# Patient Record
Sex: FEMALE | Race: OTHER | NOT HISPANIC OR LATINO
[De-identification: names, ages, dates, MRNs, and addresses within clinical notes are randomized per-mention and may not be internally consistent; named-entity substitution may affect disease eponyms.]

---

## 2017-03-14 ENCOUNTER — APPOINTMENT (OUTPATIENT)
Dept: INTERNAL MEDICINE | Facility: CLINIC | Age: 52
End: 2017-03-14

## 2017-03-17 ENCOUNTER — APPOINTMENT (OUTPATIENT)
Dept: INTERNAL MEDICINE | Facility: CLINIC | Age: 52
End: 2017-03-17

## 2017-07-13 ENCOUNTER — RESULT CHARGE (OUTPATIENT)
Age: 52
End: 2017-07-13

## 2017-07-14 ENCOUNTER — NON-APPOINTMENT (OUTPATIENT)
Age: 52
End: 2017-07-14

## 2017-07-14 ENCOUNTER — LABORATORY RESULT (OUTPATIENT)
Age: 52
End: 2017-07-14

## 2017-07-14 ENCOUNTER — APPOINTMENT (OUTPATIENT)
Dept: INTERNAL MEDICINE | Facility: CLINIC | Age: 52
End: 2017-07-14

## 2017-07-14 ENCOUNTER — OUTPATIENT (OUTPATIENT)
Dept: OUTPATIENT SERVICES | Facility: HOSPITAL | Age: 52
LOS: 1 days | End: 2017-07-14
Payer: SELF-PAY

## 2017-07-14 VITALS
BODY MASS INDEX: 34.96 KG/M2 | SYSTOLIC BLOOD PRESSURE: 118 MMHG | WEIGHT: 190 LBS | DIASTOLIC BLOOD PRESSURE: 70 MMHG | HEIGHT: 62 IN

## 2017-07-14 DIAGNOSIS — I10 ESSENTIAL (PRIMARY) HYPERTENSION: ICD-10-CM

## 2017-07-14 PROCEDURE — 90715 TDAP VACCINE 7 YRS/> IM: CPT

## 2017-07-14 PROCEDURE — 84443 ASSAY THYROID STIM HORMONE: CPT

## 2017-07-14 PROCEDURE — 83036 HEMOGLOBIN GLYCOSYLATED A1C: CPT

## 2017-07-14 PROCEDURE — 80061 LIPID PANEL: CPT

## 2017-07-14 PROCEDURE — G0463: CPT

## 2017-07-14 PROCEDURE — 90471 IMMUNIZATION ADMIN: CPT

## 2017-07-14 PROCEDURE — 93005 ELECTROCARDIOGRAM TRACING: CPT

## 2017-07-15 LAB
CHOLEST SERPL-MCNC: 220 MG/DL — HIGH (ref 10–199)
HBA1C BLD-MCNC: 6.3 % — HIGH (ref 4–5.6)
HDLC SERPL-MCNC: 60 MG/DL — SIGNIFICANT CHANGE UP (ref 40–125)
LIPID PNL WITH DIRECT LDL SERPL: 131 MG/DL — HIGH
T4 FREE+ TSH PNL SERPL: 2.15 UIU/ML — SIGNIFICANT CHANGE UP (ref 0.27–4.2)
TOTAL CHOLESTEROL/HDL RATIO MEASUREMENT: 3.7 RATIO — SIGNIFICANT CHANGE UP (ref 3.3–7.1)
TRIGL SERPL-MCNC: 147 MG/DL — SIGNIFICANT CHANGE UP (ref 10–149)

## 2017-07-19 DIAGNOSIS — Z23 ENCOUNTER FOR IMMUNIZATION: ICD-10-CM

## 2017-07-19 DIAGNOSIS — B35.3 TINEA PEDIS: ICD-10-CM

## 2017-07-19 DIAGNOSIS — R00.2 PALPITATIONS: ICD-10-CM

## 2017-07-19 DIAGNOSIS — R73.02 IMPAIRED GLUCOSE TOLERANCE (ORAL): ICD-10-CM

## 2017-07-19 DIAGNOSIS — E66.9 OBESITY, UNSPECIFIED: ICD-10-CM

## 2017-08-10 ENCOUNTER — FORM ENCOUNTER (OUTPATIENT)
Age: 52
End: 2017-08-10

## 2017-08-11 ENCOUNTER — APPOINTMENT (OUTPATIENT)
Dept: MAMMOGRAPHY | Facility: IMAGING CENTER | Age: 52
End: 2017-08-11
Payer: COMMERCIAL

## 2017-08-11 ENCOUNTER — OUTPATIENT (OUTPATIENT)
Dept: OUTPATIENT SERVICES | Facility: HOSPITAL | Age: 52
LOS: 1 days | End: 2017-08-11
Payer: SELF-PAY

## 2017-08-11 DIAGNOSIS — B35.3 TINEA PEDIS: ICD-10-CM

## 2017-08-11 DIAGNOSIS — R73.02 IMPAIRED GLUCOSE TOLERANCE (ORAL): ICD-10-CM

## 2017-08-11 DIAGNOSIS — Z23 ENCOUNTER FOR IMMUNIZATION: ICD-10-CM

## 2017-08-11 DIAGNOSIS — R00.2 PALPITATIONS: ICD-10-CM

## 2017-08-11 DIAGNOSIS — E66.9 OBESITY, UNSPECIFIED: ICD-10-CM

## 2017-08-11 DIAGNOSIS — I10 ESSENTIAL (PRIMARY) HYPERTENSION: ICD-10-CM

## 2017-08-11 DIAGNOSIS — Z12.31 ENCOUNTER FOR SCREENING MAMMOGRAM FOR MALIGNANT NEOPLASM OF BREAST: ICD-10-CM

## 2017-08-11 PROCEDURE — 77067 SCR MAMMO BI INCL CAD: CPT

## 2017-08-11 PROCEDURE — G0202: CPT | Mod: 26

## 2017-08-11 PROCEDURE — 77063 BREAST TOMOSYNTHESIS BI: CPT

## 2017-08-11 PROCEDURE — 77063 BREAST TOMOSYNTHESIS BI: CPT | Mod: 26

## 2017-09-13 ENCOUNTER — LABORATORY RESULT (OUTPATIENT)
Age: 52
End: 2017-09-13

## 2017-09-13 ENCOUNTER — APPOINTMENT (OUTPATIENT)
Dept: OBGYN | Facility: CLINIC | Age: 52
End: 2017-09-13
Payer: COMMERCIAL

## 2017-09-13 ENCOUNTER — RESULT REVIEW (OUTPATIENT)
Age: 52
End: 2017-09-13

## 2017-09-13 ENCOUNTER — OUTPATIENT (OUTPATIENT)
Dept: OUTPATIENT SERVICES | Facility: HOSPITAL | Age: 52
LOS: 1 days | End: 2017-09-13
Payer: SELF-PAY

## 2017-09-13 VITALS — WEIGHT: 192 LBS | SYSTOLIC BLOOD PRESSURE: 122 MMHG | DIASTOLIC BLOOD PRESSURE: 80 MMHG | BODY MASS INDEX: 35.12 KG/M2

## 2017-09-13 DIAGNOSIS — Z01.419 ENCOUNTER FOR GYNECOLOGICAL EXAMINATION (GENERAL) (ROUTINE) WITHOUT ABNORMAL FINDINGS: ICD-10-CM

## 2017-09-13 DIAGNOSIS — N76.0 ACUTE VAGINITIS: ICD-10-CM

## 2017-09-13 PROCEDURE — 87624 HPV HI-RISK TYP POOLED RSLT: CPT

## 2017-09-13 PROCEDURE — G0463: CPT

## 2017-09-13 PROCEDURE — 88175 CYTOPATH C/V AUTO FLUID REDO: CPT

## 2017-09-13 PROCEDURE — 99386 PREV VISIT NEW AGE 40-64: CPT | Mod: NC

## 2018-01-26 ENCOUNTER — APPOINTMENT (OUTPATIENT)
Dept: OPHTHALMOLOGY | Facility: CLINIC | Age: 53
End: 2018-01-26

## 2018-01-26 ENCOUNTER — OUTPATIENT (OUTPATIENT)
Dept: OUTPATIENT SERVICES | Facility: HOSPITAL | Age: 53
LOS: 1 days | End: 2018-01-26

## 2018-02-28 DIAGNOSIS — H40.003 PREGLAUCOMA, UNSPECIFIED, BILATERAL: ICD-10-CM

## 2018-03-02 ENCOUNTER — APPOINTMENT (OUTPATIENT)
Dept: DERMATOLOGY | Facility: HOSPITAL | Age: 53
End: 2018-03-02
Payer: COMMERCIAL

## 2018-03-02 ENCOUNTER — OUTPATIENT (OUTPATIENT)
Dept: OUTPATIENT SERVICES | Facility: HOSPITAL | Age: 53
LOS: 1 days | End: 2018-03-02

## 2018-03-02 VITALS
HEART RATE: 67 BPM | SYSTOLIC BLOOD PRESSURE: 121 MMHG | HEIGHT: 62 IN | WEIGHT: 193 LBS | DIASTOLIC BLOOD PRESSURE: 77 MMHG | BODY MASS INDEX: 35.51 KG/M2

## 2018-03-02 PROCEDURE — 99202 OFFICE O/P NEW SF 15 MIN: CPT | Mod: 25,NC

## 2018-03-02 PROCEDURE — 11900 INJECT SKIN LESIONS </W 7: CPT | Mod: GC,NC

## 2018-03-05 DIAGNOSIS — L91.0 HYPERTROPHIC SCAR: ICD-10-CM

## 2018-03-05 DIAGNOSIS — D22.9 MELANOCYTIC NEVI, UNSPECIFIED: ICD-10-CM

## 2018-03-16 ENCOUNTER — LABORATORY RESULT (OUTPATIENT)
Age: 53
End: 2018-03-16

## 2018-03-16 ENCOUNTER — OUTPATIENT (OUTPATIENT)
Dept: OUTPATIENT SERVICES | Facility: HOSPITAL | Age: 53
LOS: 1 days | End: 2018-03-16
Payer: SELF-PAY

## 2018-03-16 ENCOUNTER — APPOINTMENT (OUTPATIENT)
Dept: OPHTHALMOLOGY | Facility: CLINIC | Age: 53
End: 2018-03-16

## 2018-03-16 ENCOUNTER — OUTPATIENT (OUTPATIENT)
Dept: OUTPATIENT SERVICES | Facility: HOSPITAL | Age: 53
LOS: 1 days | End: 2018-03-16

## 2018-03-16 ENCOUNTER — APPOINTMENT (OUTPATIENT)
Dept: INTERNAL MEDICINE | Facility: CLINIC | Age: 53
End: 2018-03-16

## 2018-03-16 VITALS
HEART RATE: 68 BPM | BODY MASS INDEX: 34.75 KG/M2 | SYSTOLIC BLOOD PRESSURE: 110 MMHG | WEIGHT: 190 LBS | OXYGEN SATURATION: 99 % | DIASTOLIC BLOOD PRESSURE: 70 MMHG

## 2018-03-16 DIAGNOSIS — I10 ESSENTIAL (PRIMARY) HYPERTENSION: ICD-10-CM

## 2018-03-16 DIAGNOSIS — R60.0 LOCALIZED EDEMA: ICD-10-CM

## 2018-03-16 DIAGNOSIS — R73.02 IMPAIRED GLUCOSE TOLERANCE (ORAL): ICD-10-CM

## 2018-03-16 DIAGNOSIS — E78.5 HYPERLIPIDEMIA, UNSPECIFIED: ICD-10-CM

## 2018-03-16 DIAGNOSIS — D22.9 MELANOCYTIC NEVI, UNSPECIFIED: ICD-10-CM

## 2018-03-16 DIAGNOSIS — E66.9 OBESITY, UNSPECIFIED: ICD-10-CM

## 2018-03-16 DIAGNOSIS — B35.3 TINEA PEDIS: ICD-10-CM

## 2018-03-16 DIAGNOSIS — Z01.419 ENCOUNTER FOR GYNECOLOGICAL EXAMINATION (GENERAL) (ROUTINE) W/OUT ABNORMAL FINDINGS: ICD-10-CM

## 2018-03-16 LAB — HBA1C BLD-MCNC: 6.3 % — HIGH (ref 4–5.6)

## 2018-03-16 PROCEDURE — G0463: CPT

## 2018-03-16 PROCEDURE — 80048 BASIC METABOLIC PNL TOTAL CA: CPT

## 2018-03-16 PROCEDURE — 83036 HEMOGLOBIN GLYCOSYLATED A1C: CPT

## 2018-03-16 RX ORDER — CLOTRIMAZOLE 1% ANTIFUNGAL CREAM 1 G/100G
1 CREAM TOPICAL TWICE DAILY
Qty: 1 | Refills: 0 | Status: DISCONTINUED | COMMUNITY
Start: 2017-07-14 | End: 2018-03-16

## 2018-03-17 LAB
ANION GAP SERPL CALC-SCNC: 15 MMOL/L — SIGNIFICANT CHANGE UP (ref 5–17)
BUN SERPL-MCNC: 17 MG/DL — SIGNIFICANT CHANGE UP (ref 7–23)
CALCIUM SERPL-MCNC: 9.7 MG/DL — SIGNIFICANT CHANGE UP (ref 8.4–10.5)
CHLORIDE SERPL-SCNC: 101 MMOL/L — SIGNIFICANT CHANGE UP (ref 96–108)
CO2 SERPL-SCNC: 23 MMOL/L — SIGNIFICANT CHANGE UP (ref 22–31)
CREAT SERPL-MCNC: 0.96 MG/DL — SIGNIFICANT CHANGE UP (ref 0.5–1.3)
GLUCOSE SERPL-MCNC: 174 MG/DL — HIGH (ref 70–99)
POTASSIUM SERPL-MCNC: 4.7 MMOL/L — SIGNIFICANT CHANGE UP (ref 3.5–5.3)
POTASSIUM SERPL-SCNC: 4.7 MMOL/L — SIGNIFICANT CHANGE UP (ref 3.5–5.3)
SODIUM SERPL-SCNC: 139 MMOL/L — SIGNIFICANT CHANGE UP (ref 135–145)

## 2018-03-28 DIAGNOSIS — R73.02 IMPAIRED GLUCOSE TOLERANCE (ORAL): ICD-10-CM

## 2018-03-28 DIAGNOSIS — E78.5 HYPERLIPIDEMIA, UNSPECIFIED: ICD-10-CM

## 2018-03-28 DIAGNOSIS — E66.9 OBESITY, UNSPECIFIED: ICD-10-CM

## 2018-03-28 DIAGNOSIS — L91.0 HYPERTROPHIC SCAR: ICD-10-CM

## 2018-04-02 DIAGNOSIS — H40.003 PREGLAUCOMA, UNSPECIFIED, BILATERAL: ICD-10-CM

## 2018-04-17 ENCOUNTER — APPOINTMENT (OUTPATIENT)
Dept: PLASTIC SURGERY | Facility: HOSPITAL | Age: 53
End: 2018-04-17

## 2018-04-17 ENCOUNTER — OUTPATIENT (OUTPATIENT)
Dept: OUTPATIENT SERVICES | Facility: HOSPITAL | Age: 53
LOS: 1 days | End: 2018-04-17

## 2018-04-17 VITALS
HEART RATE: 69 BPM | DIASTOLIC BLOOD PRESSURE: 73 MMHG | BODY MASS INDEX: 36.28 KG/M2 | WEIGHT: 198.38 LBS | SYSTOLIC BLOOD PRESSURE: 134 MMHG

## 2018-04-18 DIAGNOSIS — R22.0 LOCALIZED SWELLING, MASS AND LUMP, HEAD: ICD-10-CM

## 2018-04-20 ENCOUNTER — OUTPATIENT (OUTPATIENT)
Dept: OUTPATIENT SERVICES | Facility: HOSPITAL | Age: 53
LOS: 1 days | End: 2018-04-20

## 2018-04-20 ENCOUNTER — APPOINTMENT (OUTPATIENT)
Dept: DERMATOLOGY | Facility: HOSPITAL | Age: 53
End: 2018-04-20
Payer: COMMERCIAL

## 2018-04-20 VITALS
BODY MASS INDEX: 35.51 KG/M2 | SYSTOLIC BLOOD PRESSURE: 144 MMHG | HEART RATE: 73 BPM | WEIGHT: 193 LBS | HEIGHT: 62 IN | DIASTOLIC BLOOD PRESSURE: 85 MMHG

## 2018-04-20 PROCEDURE — 11901 INJECT SKIN LESIONS >7: CPT | Mod: GC,NC

## 2018-04-23 DIAGNOSIS — L91.0 HYPERTROPHIC SCAR: ICD-10-CM

## 2018-05-04 ENCOUNTER — APPOINTMENT (OUTPATIENT)
Dept: OPHTHALMOLOGY | Facility: CLINIC | Age: 53
End: 2018-05-04

## 2018-05-04 ENCOUNTER — OUTPATIENT (OUTPATIENT)
Dept: OUTPATIENT SERVICES | Facility: HOSPITAL | Age: 53
LOS: 1 days | End: 2018-05-04

## 2018-05-11 ENCOUNTER — APPOINTMENT (OUTPATIENT)
Dept: INTERNAL MEDICINE | Facility: CLINIC | Age: 53
End: 2018-05-11

## 2018-05-11 ENCOUNTER — OUTPATIENT (OUTPATIENT)
Dept: OUTPATIENT SERVICES | Facility: HOSPITAL | Age: 53
LOS: 1 days | End: 2018-05-11
Payer: SELF-PAY

## 2018-05-11 VITALS — DIASTOLIC BLOOD PRESSURE: 70 MMHG | SYSTOLIC BLOOD PRESSURE: 118 MMHG

## 2018-05-11 VITALS
HEIGHT: 62 IN | BODY MASS INDEX: 35.51 KG/M2 | SYSTOLIC BLOOD PRESSURE: 112 MMHG | WEIGHT: 193 LBS | OXYGEN SATURATION: 98 % | HEART RATE: 69 BPM | DIASTOLIC BLOOD PRESSURE: 70 MMHG

## 2018-05-11 DIAGNOSIS — M54.2 CERVICALGIA: ICD-10-CM

## 2018-05-11 DIAGNOSIS — I10 ESSENTIAL (PRIMARY) HYPERTENSION: ICD-10-CM

## 2018-05-11 DIAGNOSIS — E78.5 HYPERLIPIDEMIA, UNSPECIFIED: ICD-10-CM

## 2018-05-11 DIAGNOSIS — L91.0 HYPERTROPHIC SCAR: ICD-10-CM

## 2018-05-11 DIAGNOSIS — R73.02 IMPAIRED GLUCOSE TOLERANCE (ORAL): ICD-10-CM

## 2018-05-11 DIAGNOSIS — E66.9 OBESITY, UNSPECIFIED: ICD-10-CM

## 2018-05-11 DIAGNOSIS — R51 HEADACHE: ICD-10-CM

## 2018-05-11 PROCEDURE — G0463: CPT

## 2018-05-18 ENCOUNTER — OUTPATIENT (OUTPATIENT)
Dept: OUTPATIENT SERVICES | Facility: HOSPITAL | Age: 53
LOS: 1 days | End: 2018-05-18

## 2018-05-18 ENCOUNTER — APPOINTMENT (OUTPATIENT)
Dept: DERMATOLOGY | Facility: HOSPITAL | Age: 53
End: 2018-05-18
Payer: COMMERCIAL

## 2018-05-18 VITALS
HEART RATE: 66 BPM | HEIGHT: 62 IN | WEIGHT: 189 LBS | SYSTOLIC BLOOD PRESSURE: 122 MMHG | BODY MASS INDEX: 34.78 KG/M2 | DIASTOLIC BLOOD PRESSURE: 72 MMHG

## 2018-05-18 DIAGNOSIS — L91.0 HYPERTROPHIC SCAR: ICD-10-CM

## 2018-05-18 DIAGNOSIS — E85.4 ORGAN-LIMITED AMYLOIDOSIS: ICD-10-CM

## 2018-05-18 DIAGNOSIS — L99 ORGAN-LIMITED AMYLOIDOSIS: ICD-10-CM

## 2018-05-18 PROBLEM — M54.2 NECK PAIN: Status: ACTIVE | Noted: 2018-05-18

## 2018-05-18 PROBLEM — R51 HEADACHE: Status: ACTIVE | Noted: 2018-05-18

## 2018-05-18 PROCEDURE — 11900 INJECT SKIN LESIONS </W 7: CPT | Mod: NC

## 2018-05-18 PROCEDURE — 99213 OFFICE O/P EST LOW 20 MIN: CPT | Mod: 25,NC

## 2018-05-22 DIAGNOSIS — M54.2 CERVICALGIA: ICD-10-CM

## 2018-05-22 DIAGNOSIS — R51 HEADACHE: ICD-10-CM

## 2018-07-20 ENCOUNTER — APPOINTMENT (OUTPATIENT)
Dept: DERMATOLOGY | Facility: HOSPITAL | Age: 53
End: 2018-07-20
Payer: COMMERCIAL

## 2018-07-20 ENCOUNTER — OUTPATIENT (OUTPATIENT)
Dept: OUTPATIENT SERVICES | Facility: HOSPITAL | Age: 53
LOS: 1 days | End: 2018-07-20

## 2018-07-20 VITALS
SYSTOLIC BLOOD PRESSURE: 133 MMHG | DIASTOLIC BLOOD PRESSURE: 84 MMHG | BODY MASS INDEX: 36.07 KG/M2 | HEART RATE: 65 BPM | HEIGHT: 62 IN | WEIGHT: 196 LBS

## 2018-07-20 DIAGNOSIS — L91.0 HYPERTROPHIC SCAR: ICD-10-CM

## 2018-07-20 PROCEDURE — 11901 INJECT SKIN LESIONS >7: CPT | Mod: NC,GC

## 2018-08-03 ENCOUNTER — APPOINTMENT (OUTPATIENT)
Dept: DERMATOLOGY | Facility: HOSPITAL | Age: 53
End: 2018-08-03

## 2018-09-07 ENCOUNTER — APPOINTMENT (OUTPATIENT)
Dept: DERMATOLOGY | Facility: HOSPITAL | Age: 53
End: 2018-09-07
Payer: COMMERCIAL

## 2018-09-07 ENCOUNTER — OUTPATIENT (OUTPATIENT)
Dept: OUTPATIENT SERVICES | Facility: HOSPITAL | Age: 53
LOS: 1 days | End: 2018-09-07

## 2018-09-07 VITALS
HEART RATE: 64 BPM | DIASTOLIC BLOOD PRESSURE: 75 MMHG | WEIGHT: 197 LBS | HEIGHT: 62 IN | SYSTOLIC BLOOD PRESSURE: 128 MMHG | BODY MASS INDEX: 36.25 KG/M2

## 2018-09-07 DIAGNOSIS — L91.0 HYPERTROPHIC SCAR: ICD-10-CM

## 2018-09-07 PROCEDURE — 99213 OFFICE O/P EST LOW 20 MIN: CPT | Mod: NC

## 2018-09-26 DIAGNOSIS — H40.003 PREGLAUCOMA, UNSPECIFIED, BILATERAL: ICD-10-CM

## 2018-11-01 ENCOUNTER — FORM ENCOUNTER (OUTPATIENT)
Age: 53
End: 2018-11-01

## 2018-11-02 ENCOUNTER — OUTPATIENT (OUTPATIENT)
Dept: OUTPATIENT SERVICES | Facility: HOSPITAL | Age: 53
LOS: 1 days | End: 2018-11-02
Payer: SELF-PAY

## 2018-11-02 ENCOUNTER — APPOINTMENT (OUTPATIENT)
Dept: MAMMOGRAPHY | Facility: IMAGING CENTER | Age: 53
End: 2018-11-02
Payer: COMMERCIAL

## 2018-11-02 DIAGNOSIS — Z00.8 ENCOUNTER FOR OTHER GENERAL EXAMINATION: ICD-10-CM

## 2018-11-02 PROCEDURE — 77067 SCR MAMMO BI INCL CAD: CPT

## 2018-11-02 PROCEDURE — 77063 BREAST TOMOSYNTHESIS BI: CPT

## 2018-11-02 PROCEDURE — 77063 BREAST TOMOSYNTHESIS BI: CPT | Mod: 26

## 2018-11-02 PROCEDURE — 77067 SCR MAMMO BI INCL CAD: CPT | Mod: 26

## 2018-11-06 ENCOUNTER — FORM ENCOUNTER (OUTPATIENT)
Age: 53
End: 2018-11-06

## 2018-11-07 ENCOUNTER — OUTPATIENT (OUTPATIENT)
Dept: OUTPATIENT SERVICES | Facility: HOSPITAL | Age: 53
LOS: 1 days | End: 2018-11-07
Payer: SELF-PAY

## 2018-11-07 ENCOUNTER — APPOINTMENT (OUTPATIENT)
Dept: MAMMOGRAPHY | Facility: IMAGING CENTER | Age: 53
End: 2018-11-07
Payer: COMMERCIAL

## 2018-11-07 ENCOUNTER — RESULT REVIEW (OUTPATIENT)
Age: 53
End: 2018-11-07

## 2018-11-07 ENCOUNTER — APPOINTMENT (OUTPATIENT)
Dept: ULTRASOUND IMAGING | Facility: IMAGING CENTER | Age: 53
End: 2018-11-07
Payer: COMMERCIAL

## 2018-11-07 DIAGNOSIS — Z00.8 ENCOUNTER FOR OTHER GENERAL EXAMINATION: ICD-10-CM

## 2018-11-07 PROCEDURE — G0279: CPT | Mod: 26

## 2018-11-07 PROCEDURE — 76642 ULTRASOUND BREAST LIMITED: CPT | Mod: 26,LT

## 2018-11-07 PROCEDURE — 77065 DX MAMMO INCL CAD UNI: CPT | Mod: 26,LT

## 2018-11-07 PROCEDURE — 77065 DX MAMMO INCL CAD UNI: CPT

## 2018-11-07 PROCEDURE — G0279: CPT

## 2018-11-07 PROCEDURE — 76642 ULTRASOUND BREAST LIMITED: CPT

## 2018-11-09 ENCOUNTER — OUTPATIENT (OUTPATIENT)
Dept: OUTPATIENT SERVICES | Facility: HOSPITAL | Age: 53
LOS: 1 days | End: 2018-11-09

## 2018-11-09 ENCOUNTER — APPOINTMENT (OUTPATIENT)
Dept: OPHTHALMOLOGY | Facility: CLINIC | Age: 53
End: 2018-11-09

## 2018-11-29 ENCOUNTER — FORM ENCOUNTER (OUTPATIENT)
Age: 53
End: 2018-11-29

## 2018-11-29 ENCOUNTER — RESULT REVIEW (OUTPATIENT)
Age: 53
End: 2018-11-29

## 2018-11-30 ENCOUNTER — OUTPATIENT (OUTPATIENT)
Dept: OUTPATIENT SERVICES | Facility: HOSPITAL | Age: 53
LOS: 1 days | End: 2018-11-30
Payer: SELF-PAY

## 2018-11-30 ENCOUNTER — APPOINTMENT (OUTPATIENT)
Dept: ULTRASOUND IMAGING | Facility: IMAGING CENTER | Age: 53
End: 2018-11-30
Payer: SELF-PAY

## 2018-11-30 DIAGNOSIS — N63.0 UNSPECIFIED LUMP IN UNSPECIFIED BREAST: ICD-10-CM

## 2018-11-30 PROCEDURE — 19000 PUNCTURE ASPIR CYST BREAST: CPT

## 2018-11-30 PROCEDURE — 19000 PUNCTURE ASPIR CYST BREAST: CPT | Mod: LT

## 2018-11-30 PROCEDURE — 76942 ECHO GUIDE FOR BIOPSY: CPT | Mod: 26

## 2018-11-30 PROCEDURE — 88173 CYTOPATH EVAL FNA REPORT: CPT

## 2018-11-30 PROCEDURE — 88173 CYTOPATH EVAL FNA REPORT: CPT | Mod: 26

## 2018-11-30 PROCEDURE — 76942 ECHO GUIDE FOR BIOPSY: CPT

## 2018-12-05 LAB — NON-GYNECOLOGICAL CYTOLOGY STUDY: SIGNIFICANT CHANGE UP

## 2018-12-12 ENCOUNTER — OUTPATIENT (OUTPATIENT)
Dept: OUTPATIENT SERVICES | Facility: HOSPITAL | Age: 53
LOS: 1 days | End: 2018-12-12
Payer: SELF-PAY

## 2018-12-12 ENCOUNTER — APPOINTMENT (OUTPATIENT)
Dept: INTERNAL MEDICINE | Facility: CLINIC | Age: 53
End: 2018-12-12

## 2018-12-12 VITALS
SYSTOLIC BLOOD PRESSURE: 140 MMHG | DIASTOLIC BLOOD PRESSURE: 100 MMHG | BODY MASS INDEX: 36.44 KG/M2 | WEIGHT: 198 LBS | HEIGHT: 62 IN

## 2018-12-12 VITALS — DIASTOLIC BLOOD PRESSURE: 70 MMHG | SYSTOLIC BLOOD PRESSURE: 112 MMHG

## 2018-12-12 DIAGNOSIS — I10 ESSENTIAL (PRIMARY) HYPERTENSION: ICD-10-CM

## 2018-12-12 DIAGNOSIS — L65.9 NONSCARRING HAIR LOSS, UNSPECIFIED: ICD-10-CM

## 2018-12-12 DIAGNOSIS — H91.90 UNSPECIFIED HEARING LOSS, UNSPECIFIED EAR: ICD-10-CM

## 2018-12-12 PROCEDURE — G0463: CPT

## 2018-12-12 RX ORDER — TRIAMCINOLONE ACETONIDE 1 MG/G
0.1 CREAM TOPICAL 3 TIMES DAILY
Qty: 1 | Refills: 1 | Status: COMPLETED | COMMUNITY
Start: 2018-05-18 | End: 2018-12-12

## 2018-12-12 NOTE — ASSESSMENT
[FreeTextEntry1] : 52 yo F w/PMH of HTN, pre-DM (A1c 6.3% in March 2018), recent breast mass seen on screening mammography s/p FNA presenting for CPE\par \par 1. Breast mass\par - US breast provided to ensure resolution after aspiration\par - pathology c/w benign cyst\par \par 2. Hair loss\par - will check TSH today\par - pull test positive with 6 strands removed\par - recommend biotin for now - if worsening can return to derm clinic\par \par 3. Hand pain/growth\par - ?tendon calcification vs ganglion cyst\par - currently minimally bothering her\par - recommended if worsening, we can refer her to hand surgery, she declined today\par \par 4. HTN\par - Lisinopril renewed\par \par 5. HCM\par - last pap negative, HRHPV negative 9/2017\par - mammo as above\par - FIT test 2014 negative - repeat FIT today as colonoscopy declined ( recently had difficult time with prep) with understanding she will need colonoscopy if FIT positive\par - HIV negative 2017\par - Hep C negative 2017\par - TdaP 7/2017\par - got flu in 10/2018\par \par dw Dr. Kaur\par \par Kathleen Rivers, R3

## 2018-12-12 NOTE — REVIEW OF SYSTEMS
[Fever] : no fever [Chills] : no chills [Fatigue] : no fatigue [Discharge] : no discharge [Pain] : no pain [Earache] : no earache [Hearing Loss] : no hearing loss [Nasal Discharge] : no nasal discharge [Sore Throat] : no sore throat [Chest Pain] : no chest pain [Palpitations] : no palpitations [Shortness Of Breath] : no shortness of breath [Wheezing] : no wheezing [Cough] : no cough [Abdominal Pain] : no abdominal pain [Nausea] : no nausea [Vomiting] : no vomiting [Joint Pain] : no joint pain [Muscle Pain] : no muscle pain [Itching] : no itching [Skin Rash] : no skin rash [Headache] : no headache [Dizziness] : no dizziness [Fainting] : no fainting [Anxiety] : no anxiety

## 2018-12-12 NOTE — PHYSICAL EXAM
[No Acute Distress] : no acute distress [Well Nourished] : well nourished [Well Developed] : well developed [Well-Appearing] : well-appearing [Normal Sclera/Conjunctiva] : normal sclera/conjunctiva [PERRL] : pupils equal round and reactive to light [EOMI] : extraocular movements intact [Normal Outer Ear/Nose] : the outer ears and nose were normal in appearance [Normal Oropharynx] : the oropharynx was normal [Supple] : supple [No Respiratory Distress] : no respiratory distress  [Clear to Auscultation] : lungs were clear to auscultation bilaterally [No Accessory Muscle Use] : no accessory muscle use [Normal Rate] : normal rate  [Regular Rhythm] : with a regular rhythm [Normal S1, S2] : normal S1 and S2 [Soft] : abdomen soft [Non Tender] : non-tender [Normal Bowel Sounds] : normal bowel sounds [No CVA Tenderness] : no CVA  tenderness [No Rash] : no rash [Normal Gait] : normal gait [No Focal Deficits] : no focal deficits [Normal Affect] : the affect was normal [Normal Insight/Judgement] : insight and judgment were intact

## 2018-12-12 NOTE — HISTORY OF PRESENT ILLNESS
[de-identified] : Patient is a 54 yo F w/PMH of HTN, pre-DM (A1c 6.3% in March 2018), recent breast mass seen on screening mammography s/p FNA presenting for CPE.\par \par She had a routine screening mammogram in November which showed a mass in the left central breast. She underwent an FNA which was negative for malignant cells, compatible with cyst contents. Path report recommended follow up of any residual mass after aspiration.\par \par She reports she has noticed a pain in her right wrist and ?growth on the palm of her left hand for a few months now. Denies erythema, drainage or opening of the skin at the site. She works as a  and uses her left hand (she is right handed) to hold the wheel for 7-8 hours/day. She denies numbness or tingling.\par \par She is also concerned about hair loss which has been ongoing, particularly when she reyna her hair. Denies skin rashes or patches on her scalp.\par \par She also notes some hearing impairment reports that she will often have to ask people to repeat things. Notes some tinnitus on the left.\par \par Got the flu shot in October.

## 2018-12-13 LAB
ALBUMIN SERPL ELPH-MCNC: 4.5 G/DL
ALP BLD-CCNC: 71 U/L
ALT SERPL-CCNC: 13 U/L
ANION GAP SERPL CALC-SCNC: 16 MMOL/L
AST SERPL-CCNC: 16 U/L
BASOPHILS # BLD AUTO: 0.03 K/UL
BASOPHILS NFR BLD AUTO: 0.4 %
BILIRUB SERPL-MCNC: 0.5 MG/DL
BUN SERPL-MCNC: 16 MG/DL
CALCIUM SERPL-MCNC: 9.4 MG/DL
CHLORIDE SERPL-SCNC: 104 MMOL/L
CHOLEST SERPL-MCNC: 206 MG/DL
CHOLEST/HDLC SERPL: 3.1 RATIO
CO2 SERPL-SCNC: 21 MMOL/L
CREAT SERPL-MCNC: 0.77 MG/DL
EOSINOPHIL # BLD AUTO: 0.74 K/UL
EOSINOPHIL NFR BLD AUTO: 10.1 %
GLUCOSE SERPL-MCNC: 110 MG/DL
HBA1C MFR BLD HPLC: 6.5 %
HCT VFR BLD CALC: 42 %
HDLC SERPL-MCNC: 66 MG/DL
HGB BLD-MCNC: 13.4 G/DL
IMM GRANULOCYTES NFR BLD AUTO: 0.1 %
LDLC SERPL CALC-MCNC: 116 MG/DL
LYMPHOCYTES # BLD AUTO: 2.78 K/UL
LYMPHOCYTES NFR BLD AUTO: 37.8 %
MAN DIFF?: NORMAL
MCHC RBC-ENTMCNC: 29.8 PG
MCHC RBC-ENTMCNC: 31.9 GM/DL
MCV RBC AUTO: 93.5 FL
MONOCYTES # BLD AUTO: 0.33 K/UL
MONOCYTES NFR BLD AUTO: 4.5 %
NEUTROPHILS # BLD AUTO: 3.46 K/UL
NEUTROPHILS NFR BLD AUTO: 47.1 %
PLATELET # BLD AUTO: 269 K/UL
POTASSIUM SERPL-SCNC: 5.1 MMOL/L
PROT SERPL-MCNC: 7.6 G/DL
RBC # BLD: 4.49 M/UL
RBC # FLD: 13.1 %
SODIUM SERPL-SCNC: 141 MMOL/L
TRIGL SERPL-MCNC: 118 MG/DL
TSH SERPL-ACNC: 3.25 UIU/ML
WBC # FLD AUTO: 7.35 K/UL

## 2018-12-27 DIAGNOSIS — H91.90 UNSPECIFIED HEARING LOSS, UNSPECIFIED EAR: ICD-10-CM

## 2018-12-27 DIAGNOSIS — L65.9 NONSCARRING HAIR LOSS, UNSPECIFIED: ICD-10-CM

## 2018-12-27 DIAGNOSIS — N63.20 UNSPECIFIED LUMP IN THE LEFT BREAST, UNSPECIFIED QUADRANT: ICD-10-CM

## 2019-01-31 ENCOUNTER — FORM ENCOUNTER (OUTPATIENT)
Age: 54
End: 2019-01-31

## 2019-02-01 ENCOUNTER — OUTPATIENT (OUTPATIENT)
Dept: OUTPATIENT SERVICES | Facility: HOSPITAL | Age: 54
LOS: 1 days | End: 2019-02-01
Payer: SELF-PAY

## 2019-02-01 ENCOUNTER — APPOINTMENT (OUTPATIENT)
Dept: ULTRASOUND IMAGING | Facility: IMAGING CENTER | Age: 54
End: 2019-02-01

## 2019-02-01 DIAGNOSIS — N63.20 UNSPECIFIED LUMP IN THE LEFT BREAST, UNSPECIFIED QUADRANT: ICD-10-CM

## 2019-02-01 DIAGNOSIS — H91.90 UNSPECIFIED HEARING LOSS, UNSPECIFIED EAR: ICD-10-CM

## 2019-02-01 DIAGNOSIS — Z00.8 ENCOUNTER FOR OTHER GENERAL EXAMINATION: ICD-10-CM

## 2019-02-01 DIAGNOSIS — L65.9 NONSCARRING HAIR LOSS, UNSPECIFIED: ICD-10-CM

## 2019-02-01 DIAGNOSIS — I10 ESSENTIAL (PRIMARY) HYPERTENSION: ICD-10-CM

## 2019-02-01 PROCEDURE — 76642 ULTRASOUND BREAST LIMITED: CPT

## 2019-02-25 DIAGNOSIS — H40.009 PREGLAUCOMA, UNSPECIFIED, UNSPECIFIED EYE: ICD-10-CM

## 2019-04-12 ENCOUNTER — OUTPATIENT (OUTPATIENT)
Dept: OUTPATIENT SERVICES | Facility: HOSPITAL | Age: 54
LOS: 1 days | End: 2019-04-12
Payer: SELF-PAY

## 2019-04-12 ENCOUNTER — APPOINTMENT (OUTPATIENT)
Dept: INTERNAL MEDICINE | Facility: CLINIC | Age: 54
End: 2019-04-12

## 2019-04-12 ENCOUNTER — LABORATORY RESULT (OUTPATIENT)
Age: 54
End: 2019-04-12

## 2019-04-12 ENCOUNTER — NON-APPOINTMENT (OUTPATIENT)
Age: 54
End: 2019-04-12

## 2019-04-12 VITALS
DIASTOLIC BLOOD PRESSURE: 70 MMHG | HEIGHT: 62 IN | WEIGHT: 190 LBS | BODY MASS INDEX: 34.96 KG/M2 | SYSTOLIC BLOOD PRESSURE: 110 MMHG

## 2019-04-12 DIAGNOSIS — I10 ESSENTIAL (PRIMARY) HYPERTENSION: ICD-10-CM

## 2019-04-12 DIAGNOSIS — M79.606 PAIN IN LEG, UNSPECIFIED: ICD-10-CM

## 2019-04-12 LAB — HBA1C MFR BLD HPLC: 6

## 2019-04-12 PROCEDURE — G0463: CPT

## 2019-04-12 PROCEDURE — 83036 HEMOGLOBIN GLYCOSYLATED A1C: CPT

## 2019-04-12 PROCEDURE — 93005 ELECTROCARDIOGRAM TRACING: CPT

## 2019-04-12 PROCEDURE — 85379 FIBRIN DEGRADATION QUANT: CPT

## 2019-04-12 NOTE — PHYSICAL EXAM
[No Acute Distress] : no acute distress [Supple] : supple [No Respiratory Distress] : no respiratory distress  [Clear to Auscultation] : lungs were clear to auscultation bilaterally [Normal Rate] : normal rate  [Regular Rhythm] : with a regular rhythm [Normal S1, S2] : normal S1 and S2 [Soft] : abdomen soft [Non Tender] : non-tender [No HSM] : no HSM [Coordination Grossly Intact] : coordination grossly intact

## 2019-04-12 NOTE — HISTORY OF PRESENT ILLNESS
[FreeTextEntry8] : Patient is a 53 yo F w/PMH of HTN, DM (A1c 6.5% in Dec 2018), recent breast mass seen on screening mammography s/p FNA (negative for malignant cells) w/ LT arm pain and LT leg pain, x 1 week ago. She went for a body massage on 4/5. She rated her pain initially as 8/10 and now it 4/10. The arm worsens with movement and is alleviated with rest. The pain is intermittent and radiates to her LT elbow. She also reports LT leg numbness and tingling that started five days ago. She traveled to [facility] on March 15th-25th, 2019 and was on a five hour flight. Denies hx of blood clots. She had a US breast, which showed BiRADS 2.  Pt reports being under stress because her son is in the hospital. Denies f/c/h/d/n/v. \par \par Denies having taken OTC medication. She is currently on metformin 500 mg BID, aspirin 81 mg, and lisinopril 10 mg. \par

## 2019-04-12 NOTE — REVIEW OF SYSTEMS
[Muscle Pain] : muscle pain [Fever] : no fever [Chills] : no chills [Palpitations] : no palpitations [Shortness Of Breath] : no shortness of breath [Chest Pain] : no chest pain [Abdominal Pain] : no abdominal pain [Nausea] : no nausea [Cough] : no cough [Vomiting] : no vomiting [Back Pain] : no back pain [Headache] : no headache [Dizziness] : no dizziness [FreeTextEntry9] : LT shoulder and LT leg pain

## 2019-04-12 NOTE — ASSESSMENT
[FreeTextEntry1] : Patient is a 55 yo F w/PMH of HTN, DM (A1c 6.5% in Dec 2018), recent breast mass seen on screening mammography s/p FNA (negative for malignant cells) w/ LT arm pain and LT leg pain, x 1 week ago\par \par # LT arm- likely MSK\par - OTC ibuprofen 200-600 mg TID for 7 days w/ warm compresses\par - f/u in 1-2 weeks, if sx do not improve, will consider PT referral\par \par # Lt leg pain\par - f/u D-dimer \par - ibuprofen 200-600 mg TID for 7 seven days \par \par # T2DM\par - lowered metformin dose to 500 mg QD. Will adjust dosing after next HgA1c\par - POCT HgA1c- 6.0\par \par - RTC in 1-2 weeks to reassess or 3 months f/u\par - Case d/w Dr. Garay

## 2019-04-12 NOTE — HEALTH RISK ASSESSMENT
[0] : 2) Feeling down, depressed, or hopeless: Not at all (0) [] : No [[id number]] : 0 [de-identified] : drinks wine, vodka twice a month

## 2019-04-13 LAB — D DIMER BLD IA.RAPID-MCNC: <150 NG/ML DDU — SIGNIFICANT CHANGE UP

## 2019-04-16 DIAGNOSIS — M79.606 PAIN IN LEG, UNSPECIFIED: ICD-10-CM

## 2019-04-16 DIAGNOSIS — E11.9 TYPE 2 DIABETES MELLITUS WITHOUT COMPLICATIONS: ICD-10-CM

## 2019-04-26 ENCOUNTER — APPOINTMENT (OUTPATIENT)
Dept: OPHTHALMOLOGY | Facility: CLINIC | Age: 54
End: 2019-04-26

## 2019-07-17 ENCOUNTER — RX RENEWAL (OUTPATIENT)
Age: 54
End: 2019-07-17

## 2019-07-17 ENCOUNTER — APPOINTMENT (OUTPATIENT)
Dept: INTERNAL MEDICINE | Facility: CLINIC | Age: 54
End: 2019-07-17

## 2019-07-17 ENCOUNTER — OUTPATIENT (OUTPATIENT)
Dept: OUTPATIENT SERVICES | Facility: HOSPITAL | Age: 54
LOS: 1 days | End: 2019-07-17
Payer: SELF-PAY

## 2019-07-17 VITALS
HEIGHT: 62 IN | HEART RATE: 69 BPM | SYSTOLIC BLOOD PRESSURE: 122 MMHG | WEIGHT: 192 LBS | DIASTOLIC BLOOD PRESSURE: 80 MMHG | BODY MASS INDEX: 35.33 KG/M2 | OXYGEN SATURATION: 98 %

## 2019-07-17 DIAGNOSIS — M72.2 PLANTAR FASCIAL FIBROMATOSIS: ICD-10-CM

## 2019-07-17 DIAGNOSIS — I10 ESSENTIAL (PRIMARY) HYPERTENSION: ICD-10-CM

## 2019-07-17 LAB — HBA1C MFR BLD HPLC: 6.1

## 2019-07-17 PROCEDURE — 83036 HEMOGLOBIN GLYCOSYLATED A1C: CPT

## 2019-07-17 PROCEDURE — G0009: CPT

## 2019-07-17 PROCEDURE — 90732 PPSV23 VACC 2 YRS+ SUBQ/IM: CPT

## 2019-07-17 PROCEDURE — G0463: CPT | Mod: 25

## 2019-07-17 NOTE — ASSESSMENT
[FreeTextEntry1] : # Diabetes Mellitus \par Diabetes Mellitus. Current regimen includes metformin 500mg PO daily. She is tolerating metformin well with minimum side effects. Good pedal pulses both feet, and foot exam did not reveal any open sores, ulcers, or wounds. \par Last A1c 6 (4/12/2019). Repeat A1c 6.1  today (7/17/2019). Patient mostly doing lifestyle modification to manage her Diabetes. Patient ages above 40, and recent  on 12/Dec/2018, and would benefit from moderate intensity statin. \par \par Plan:\par 1. Continue metformin 500mg PO daily\par 2. Recheck A1c in 3 months. \par 3. Diabetic eye exam referral. \par 4. Start aorvastatin 10mg PO daily. \par \par # Hypertension\par Patient does not regularly measures her BP at home, but does report having a BP measuring device at home. She occasionally measures her BP at a pharmacy, but reports BP runs 120/80. \par \par Plan:\par 1. Continue lisinopril 10mg PO daily.\par 2. BMP today. \par \par # Plantar fasciitis\par Patient most likely has plantar fasciitis. \par \par Plan:\par 1.  Calf stretch exercises. Wear shoe insert.\par 2.  Referral to a podiatrist. \par \par # Health maintenance\par Patient is overdue for colon screening, but would like to defer colonoscopy. And also due for pneumo 23 vaccine.\par \par Plan: \par 1. Immuno fecal occult blood test. \par 2. Pneumo 23 vaccine.

## 2019-07-17 NOTE — PHYSICAL EXAM
[No Acute Distress] : no acute distress [Well Nourished] : well nourished [PERRL] : pupils equal round and reactive to light [Normal Outer Ear/Nose] : the outer ears and nose were normal in appearance [No JVD] : no jugular venous distention [No Lymphadenopathy] : no lymphadenopathy [Thyroid Normal, No Nodules] : the thyroid was normal and there were no nodules present [Normal Rate] : normal rate  [Regular Rhythm] : with a regular rhythm [Normal S1, S2] : normal S1 and S2 [No Murmur] : no murmur heard [No Abdominal Bruit] : a ~M bruit was not heard ~T in the abdomen [Pedal Pulses Present] : the pedal pulses are present [No Edema] : there was no peripheral edema [No Extremity Clubbing/Cyanosis] : no extremity clubbing/cyanosis [Soft] : abdomen soft [Non Tender] : non-tender [Non-distended] : non-distended [Normal Bowel Sounds] : normal bowel sounds [No Hernias] : no hernias [Normal Supraclavicular Nodes] : no supraclavicular lymphadenopathy [Normal Anterior Cervical Nodes] : no anterior cervical lymphadenopathy [Normal Affect] : the affect was normal [Normal Mood] : the mood was normal [Normal Insight/Judgement] : insight and judgment were intact [Comprehensive Foot Exam Normal] : Right and left foot were examined and both feet are normal. No ulcers in either foot. Toes are normal and with full ROM.  Normal tactile sensation with monofilament testing throughout both feet

## 2019-07-17 NOTE — HISTORY OF PRESENT ILLNESS
[FreeTextEntry1] : Followup DM. R plantar pain.  [de-identified] : 54 year old female with history significant for HTN, DM presents for followup on DM management pain and a complain of R plantar pain for one week. On routine mammogram in November 2018 a mass was noted in left central breast, FNA showed no malignant cells, FNA results consistent with cyst contents. \par            Patient has been doing well since last seen, continue taking metformin 500mg and lisinopril 10mg PO daily, reports good compliance. Patient exercises actively. \par           She notices R plantar pain since a week ago. The pain worsens with walking and pressure. The pain improves with rest. She did not take any medication to attempt to relieve the pain.

## 2019-07-17 NOTE — REVIEW OF SYSTEMS
[Fever] : no fever [Chills] : no chills [Fatigue] : no fatigue [Chest Pain] : no chest pain [Palpitations] : no palpitations [Lower Ext Edema] : no lower extremity edema [Shortness Of Breath] : no shortness of breath [Cough] : no cough [Abdominal Pain] : no abdominal pain [Nausea] : no nausea [Vomiting] : no vomiting [Anxiety] : no anxiety [Depression] : no depression [FreeTextEntry9] : R plantar pain as per HPI

## 2019-07-17 NOTE — PLAN
[FreeTextEntry1] : #Diabetes Mellitus\par 1. Continue metformin 500mg PO daily\par 2. Recheck A1c in 3 months. \par 3. Diabetic eye exam referral. \par 4. Start aorvastatin 10mg PO daily. \par \par #HTN \par 1. Continue lisinopril 10mg PO daily.\par 2. BMP today. \par \par #Plantar Fasciitis\par 1.  Calf stretch exercises. Wear shoe insert.\par 2.  Referral to a podiatrist. \par \par # Health maintenance\par 1. Immuno fecal occult blood test. \par 2. Pneumo 23 vaccine. \par \par \par

## 2019-07-18 LAB
ANION GAP SERPL CALC-SCNC: 11 MMOL/L
BUN SERPL-MCNC: 17 MG/DL
CALCIUM SERPL-MCNC: 9.5 MG/DL
CHLORIDE SERPL-SCNC: 104 MMOL/L
CO2 SERPL-SCNC: 23 MMOL/L
CREAT SERPL-MCNC: 0.77 MG/DL
GLUCOSE SERPL-MCNC: 100 MG/DL
POTASSIUM SERPL-SCNC: 4.7 MMOL/L
SODIUM SERPL-SCNC: 138 MMOL/L

## 2019-07-22 DIAGNOSIS — E11.9 TYPE 2 DIABETES MELLITUS WITHOUT COMPLICATIONS: ICD-10-CM

## 2019-07-22 DIAGNOSIS — Z12.11 ENCOUNTER FOR SCREENING FOR MALIGNANT NEOPLASM OF COLON: ICD-10-CM

## 2019-07-22 DIAGNOSIS — M72.2 PLANTAR FASCIAL FIBROMATOSIS: ICD-10-CM

## 2019-08-30 ENCOUNTER — APPOINTMENT (OUTPATIENT)
Dept: OPHTHALMOLOGY | Facility: CLINIC | Age: 54
End: 2019-08-30

## 2019-12-14 ENCOUNTER — EMERGENCY (EMERGENCY)
Facility: HOSPITAL | Age: 54
LOS: 0 days | Discharge: HOME | End: 2019-12-14
Attending: EMERGENCY MEDICINE
Payer: COMMERCIAL

## 2019-12-14 VITALS
RESPIRATION RATE: 18 BRPM | WEIGHT: 176.37 LBS | SYSTOLIC BLOOD PRESSURE: 141 MMHG | DIASTOLIC BLOOD PRESSURE: 81 MMHG | HEART RATE: 60 BPM | HEIGHT: 63 IN | TEMPERATURE: 98 F | OXYGEN SATURATION: 100 %

## 2019-12-14 VITALS
OXYGEN SATURATION: 98 % | TEMPERATURE: 98 F | DIASTOLIC BLOOD PRESSURE: 66 MMHG | HEART RATE: 59 BPM | RESPIRATION RATE: 18 BRPM | SYSTOLIC BLOOD PRESSURE: 110 MMHG

## 2019-12-14 DIAGNOSIS — R07.9 CHEST PAIN, UNSPECIFIED: ICD-10-CM

## 2019-12-14 DIAGNOSIS — E11.9 TYPE 2 DIABETES MELLITUS WITHOUT COMPLICATIONS: ICD-10-CM

## 2019-12-14 DIAGNOSIS — I10 ESSENTIAL (PRIMARY) HYPERTENSION: Chronic | ICD-10-CM

## 2019-12-14 DIAGNOSIS — I10 ESSENTIAL (PRIMARY) HYPERTENSION: ICD-10-CM

## 2019-12-14 DIAGNOSIS — E11.9 TYPE 2 DIABETES MELLITUS WITHOUT COMPLICATIONS: Chronic | ICD-10-CM

## 2019-12-14 LAB
ALBUMIN SERPL ELPH-MCNC: 3.7 G/DL — SIGNIFICANT CHANGE UP (ref 3.3–5)
ALP SERPL-CCNC: 77 U/L — SIGNIFICANT CHANGE UP (ref 40–120)
ALT FLD-CCNC: 21 U/L — SIGNIFICANT CHANGE UP (ref 12–78)
ANION GAP SERPL CALC-SCNC: 4 MMOL/L — LOW (ref 5–17)
APTT BLD: 33.2 SEC — SIGNIFICANT CHANGE UP (ref 28.5–37)
AST SERPL-CCNC: 15 U/L — SIGNIFICANT CHANGE UP (ref 15–37)
BASOPHILS # BLD AUTO: 0.03 K/UL — SIGNIFICANT CHANGE UP (ref 0–0.2)
BASOPHILS NFR BLD AUTO: 0.4 % — SIGNIFICANT CHANGE UP (ref 0–2)
BILIRUB SERPL-MCNC: 0.6 MG/DL — SIGNIFICANT CHANGE UP (ref 0.2–1.2)
BUN SERPL-MCNC: 18 MG/DL — SIGNIFICANT CHANGE UP (ref 7–23)
CALCIUM SERPL-MCNC: 9 MG/DL — SIGNIFICANT CHANGE UP (ref 8.5–10.1)
CHLORIDE SERPL-SCNC: 110 MMOL/L — HIGH (ref 96–108)
CK MB CFR SERPL CALC: 1.2 NG/ML — SIGNIFICANT CHANGE UP (ref 0.5–3.6)
CO2 SERPL-SCNC: 26 MMOL/L — SIGNIFICANT CHANGE UP (ref 22–31)
CREAT SERPL-MCNC: 0.81 MG/DL — SIGNIFICANT CHANGE UP (ref 0.5–1.3)
EOSINOPHIL # BLD AUTO: 0.37 K/UL — SIGNIFICANT CHANGE UP (ref 0–0.5)
EOSINOPHIL NFR BLD AUTO: 5.2 % — SIGNIFICANT CHANGE UP (ref 0–6)
GLUCOSE SERPL-MCNC: 100 MG/DL — HIGH (ref 70–99)
HCG SERPL-ACNC: 3 MIU/ML — SIGNIFICANT CHANGE UP
HCT VFR BLD CALC: 39.2 % — SIGNIFICANT CHANGE UP (ref 34.5–45)
HGB BLD-MCNC: 12.6 G/DL — SIGNIFICANT CHANGE UP (ref 11.5–15.5)
IMM GRANULOCYTES NFR BLD AUTO: 0.1 % — SIGNIFICANT CHANGE UP (ref 0–1.5)
INR BLD: 0.97 RATIO — SIGNIFICANT CHANGE UP (ref 0.88–1.16)
LIDOCAIN IGE QN: 142 U/L — SIGNIFICANT CHANGE UP (ref 73–393)
LYMPHOCYTES # BLD AUTO: 2.65 K/UL — SIGNIFICANT CHANGE UP (ref 1–3.3)
LYMPHOCYTES # BLD AUTO: 37.6 % — SIGNIFICANT CHANGE UP (ref 13–44)
MAGNESIUM SERPL-MCNC: 2.1 MG/DL — SIGNIFICANT CHANGE UP (ref 1.6–2.6)
MCHC RBC-ENTMCNC: 29.4 PG — SIGNIFICANT CHANGE UP (ref 27–34)
MCHC RBC-ENTMCNC: 32.1 GM/DL — SIGNIFICANT CHANGE UP (ref 32–36)
MCV RBC AUTO: 91.4 FL — SIGNIFICANT CHANGE UP (ref 80–100)
MONOCYTES # BLD AUTO: 0.37 K/UL — SIGNIFICANT CHANGE UP (ref 0–0.9)
MONOCYTES NFR BLD AUTO: 5.2 % — SIGNIFICANT CHANGE UP (ref 2–14)
NEUTROPHILS # BLD AUTO: 3.62 K/UL — SIGNIFICANT CHANGE UP (ref 1.8–7.4)
NEUTROPHILS NFR BLD AUTO: 51.5 % — SIGNIFICANT CHANGE UP (ref 43–77)
NRBC # BLD: 0 /100 WBCS — SIGNIFICANT CHANGE UP (ref 0–0)
NT-PROBNP SERPL-SCNC: 31 PG/ML — SIGNIFICANT CHANGE UP (ref 0–125)
PLATELET # BLD AUTO: 253 K/UL — SIGNIFICANT CHANGE UP (ref 150–400)
POTASSIUM SERPL-MCNC: 4.4 MMOL/L — SIGNIFICANT CHANGE UP (ref 3.5–5.3)
POTASSIUM SERPL-SCNC: 4.4 MMOL/L — SIGNIFICANT CHANGE UP (ref 3.5–5.3)
PROT SERPL-MCNC: 7.5 GM/DL — SIGNIFICANT CHANGE UP (ref 6–8.3)
PROTHROM AB SERPL-ACNC: 10.9 SEC — SIGNIFICANT CHANGE UP (ref 10–12.9)
RBC # BLD: 4.29 M/UL — SIGNIFICANT CHANGE UP (ref 3.8–5.2)
RBC # FLD: 12.5 % — SIGNIFICANT CHANGE UP (ref 10.3–14.5)
SODIUM SERPL-SCNC: 140 MMOL/L — SIGNIFICANT CHANGE UP (ref 135–145)
TROPONIN I SERPL-MCNC: <.015 NG/ML — SIGNIFICANT CHANGE UP (ref 0.01–0.04)
WBC # BLD: 7.05 K/UL — SIGNIFICANT CHANGE UP (ref 3.8–10.5)
WBC # FLD AUTO: 7.05 K/UL — SIGNIFICANT CHANGE UP (ref 3.8–10.5)

## 2019-12-14 PROCEDURE — 93010 ELECTROCARDIOGRAM REPORT: CPT

## 2019-12-14 PROCEDURE — 99285 EMERGENCY DEPT VISIT HI MDM: CPT

## 2019-12-14 PROCEDURE — 71045 X-RAY EXAM CHEST 1 VIEW: CPT | Mod: 26

## 2019-12-14 NOTE — ED ADULT NURSE NOTE - OBJECTIVE STATEMENT
Pt c/o chest pain with difficulty breathing ongoing x 2 month. Pt states episode intermittent. today episode upon awakening this morning prior to taking her daily bp medications. pt states sharp chest pain. pt also c/o nubness  tingling on left side of body. Pt denies shortness of breath.

## 2019-12-14 NOTE — ED PROVIDER NOTE - PATIENT PORTAL LINK FT
You can access the FollowMyHealth Patient Portal offered by [facility] by registering at the following website: [email or web address]. By joining [facility]’s FollowMyHealth portal, you will also be able to view your health information using other applications (apps) compatible with our system.

## 2019-12-14 NOTE — ED PROVIDER NOTE - CLINICAL SUMMARY MEDICAL DECISION MAKING FREE TEXT BOX
ED NOW-Patient agrees and will go to ED. Aware someone else should drive. 911 if symptoms worsen.  Reason for Disposition  • [1] MODERATE difficulty breathing (e.g., speaks in phrases, SOB even at rest, pulse 100-120) AND [2] NEW-onset or WORSE than normal    Protocols used: BREATHING DIFFICULTY-A-AH       pt pw intermittent cp. likely msk or gi. will rule out acs. pt pw intermittent cp. likely msk or gi. will rule out acs. trop neg and patient still with no symptoms in the er. As interpreted by ED physician, ECG is NSR with normal intervals/axis, no changes in QRS, no ST/T changes.  labs all normal. ok for dc home.

## 2019-12-14 NOTE — ED PROVIDER NOTE - OBJECTIVE STATEMENT
Pertinent PMH/PSH/FHx/SHx and Review of Systems contained within:  54F hx of htn and dm pw cp. pt ntoes she has been having on and off achy cp radiating to the back since the past week and half. last felt it 9a this morning. 10am it self resolved. she also feels intermittent numbness of the left posterior calf. no sob, nausea, vomiting. she further notes some blurred vision and chills this morning with cold sweat, not now. ros neg for rash, bleeding, dysuria, rhinorrhea.   Fh and Sh not otherwise contributory  ROS otherwise negative Pertinent PMH/PSH/FHx/SHx and Review of Systems contained within:  54F hx of htn and dm pw cp. pt ntoes she has been having on and off achy cp radiating to the back since the past week and half. last felt it 9a this morning. 10am it self resolved. it is random and not associated with exertion or position. she also feels intermittent numbness of the left posterior calf. no sob, nausea, vomiting. she further notes some blurred vision and chills this morning with cold sweat, not now. ros neg for rash, bleeding, dysuria, rhinorrhea.   Fh and Sh not otherwise contributory  ROS otherwise negative

## 2019-12-14 NOTE — ED ADULT TRIAGE NOTE - CHIEF COMPLAINT QUOTE
Pt c/o chest pain with difficulty breathing, pt c/o tingling on left side of body and hot and cold sweats x 1 week. Seen at urgent care center referred to f/u with PMD. Pt states today same feelings. hx htn dm

## 2019-12-14 NOTE — ED ADULT NURSE NOTE - NSIMPLEMENTINTERV_GEN_ALL_ED
yes Implemented All Universal Safety Interventions:  [place] to call system. Call bell, personal items and telephone within reach. Instruct patient to call for assistance. Room bathroom lighting operational. Non-slip footwear when patient is off stretcher. Physically safe environment: no spills, clutter or unnecessary equipment. Stretcher in lowest position, wheels locked, appropriate side rails in place.

## 2019-12-16 PROBLEM — I10 ESSENTIAL (PRIMARY) HYPERTENSION: Chronic | Status: ACTIVE | Noted: 2019-12-14

## 2019-12-20 ENCOUNTER — RESULT REVIEW (OUTPATIENT)
Age: 54
End: 2019-12-20

## 2019-12-20 ENCOUNTER — APPOINTMENT (OUTPATIENT)
Dept: INTERNAL MEDICINE | Facility: CLINIC | Age: 54
End: 2019-12-20
Payer: COMMERCIAL

## 2019-12-20 ENCOUNTER — OUTPATIENT (OUTPATIENT)
Dept: OUTPATIENT SERVICES | Facility: HOSPITAL | Age: 54
LOS: 1 days | End: 2019-12-20
Payer: COMMERCIAL

## 2019-12-20 VITALS
BODY MASS INDEX: 34.96 KG/M2 | SYSTOLIC BLOOD PRESSURE: 122 MMHG | WEIGHT: 190 LBS | DIASTOLIC BLOOD PRESSURE: 80 MMHG | HEIGHT: 62 IN

## 2019-12-20 DIAGNOSIS — E11.9 TYPE 2 DIABETES MELLITUS WITHOUT COMPLICATIONS: Chronic | ICD-10-CM

## 2019-12-20 DIAGNOSIS — E11.9 TYPE 2 DIABETES MELLITUS W/OUT COMPLICATIONS: ICD-10-CM

## 2019-12-20 DIAGNOSIS — I10 ESSENTIAL (PRIMARY) HYPERTENSION: ICD-10-CM

## 2019-12-20 DIAGNOSIS — I10 ESSENTIAL (PRIMARY) HYPERTENSION: Chronic | ICD-10-CM

## 2019-12-20 DIAGNOSIS — R07.9 CHEST PAIN, UNSPECIFIED: ICD-10-CM

## 2019-12-20 DIAGNOSIS — Z00.00 ENCOUNTER FOR GENERAL ADULT MEDICAL EXAMINATION W/OUT ABNORMAL FINDINGS: ICD-10-CM

## 2019-12-20 LAB — HBA1C MFR BLD HPLC: 6.5

## 2019-12-20 PROCEDURE — 99213 OFFICE O/P EST LOW 20 MIN: CPT | Mod: GE

## 2019-12-20 PROCEDURE — G0463: CPT | Mod: 25

## 2019-12-20 PROCEDURE — 83036 HEMOGLOBIN GLYCOSYLATED A1C: CPT

## 2019-12-20 RX ORDER — METFORMIN HYDROCHLORIDE 500 MG/1
500 TABLET, COATED ORAL
Qty: 60 | Refills: 4 | Status: ACTIVE | COMMUNITY
Start: 2018-12-13 | End: 1900-01-01

## 2019-12-20 RX ORDER — ATORVASTATIN CALCIUM 10 MG/1
10 TABLET, FILM COATED ORAL DAILY
Qty: 30 | Refills: 11 | Status: ACTIVE | COMMUNITY
Start: 2019-07-17 | End: 1900-01-01

## 2019-12-20 NOTE — HISTORY OF PRESENT ILLNESS
[de-identified] : Pt is a 53 yo F w/ PMx HTN, DM, presenting for post-ED f/u. \par Pt went to the ED  for chest pain. That week she had recurring episodes of new CP and went to an urgent care where her EKG was unremarkable. She was told to go to the ED if the pain recurred which prompted her to go to the ED. The pain is pressure like and is associated with numbness/tingling of her L arm. Occurs at rest, not with physical activity. She also has diaphoresis, no dyspnea or radiation of pain elsewhere. In the ED, EKG was unremarkable (sinus howie, no T wave abnormalities, ST depressions/elevations). Troponins negative. Pain recurred for a few minutes this am.\par Pt had a stress test a number of years ago that was normal. Pt has a strong family hx of CAD. Brothers had MIs and sister  due to massive MI in her 60s. \par \par #DM2 - pt taking 500 mg metformin qd, was takig bid before. diagnosed Dec 2018 (A1C 6.5 at that time)

## 2019-12-20 NOTE — PHYSICAL EXAM
[No Acute Distress] : no acute distress [Well Nourished] : well nourished [Well Developed] : well developed [Normal Sclera/Conjunctiva] : normal sclera/conjunctiva [PERRL] : pupils equal round and reactive to light [Normal Oropharynx] : the oropharynx was normal [No Lymphadenopathy] : no lymphadenopathy [No Respiratory Distress] : no respiratory distress  [No Accessory Muscle Use] : no accessory muscle use [Clear to Auscultation] : lungs were clear to auscultation bilaterally [Regular Rhythm] : with a regular rhythm [Normal Rate] : normal rate  [Normal S1, S2] : normal S1 and S2 [No Edema] : there was no peripheral edema [Soft] : abdomen soft [Normal Posterior Cervical Nodes] : no posterior cervical lymphadenopathy [Non Tender] : non-tender [Non-distended] : non-distended [No Rash] : no rash [Normal Anterior Cervical Nodes] : no anterior cervical lymphadenopathy [Normal Affect] : the affect was normal [de-identified] : no pain reproduced on palpation of L chest wall

## 2019-12-20 NOTE — REVIEW OF SYSTEMS
[Chest Pain] : chest pain [Fever] : no fever [Fatigue] : no fatigue [Chills] : no chills [Recent Change In Weight] : ~T no recent weight change [Vision Problems] : no vision problems [Nasal Discharge] : no nasal discharge [Palpitations] : no palpitations [Sore Throat] : no sore throat [Lower Ext Edema] : no lower extremity edema [Orthopnea] : no orthopnea [Shortness Of Breath] : no shortness of breath [Cough] : no cough [Dyspnea on Exertion] : no dyspnea on exertion [Abdominal Pain] : no abdominal pain [Vomiting] : no vomiting [Dysuria] : no dysuria [Joint Pain] : no joint pain [Skin Rash] : no skin rash [Muscle Pain] : no muscle pain [Headache] : no headache

## 2019-12-20 NOTE — PLAN
[FreeTextEntry1] : Pt is a 53 yo F w/ PMx HTN, DM, presenting for post-ED f/u. \par \par #Chest pain. Pressure-like, associated with diaphoresis and L arm symptoms. recent ED EKG unremarkable\par - concern for CAD given strong family history, will order stress echo\par \par #DM2\par - POC A1C 6.5 today after reduction of metformin to 500 mg qd. will increase to bid\par - atorvastatin renewed\par \par #HCM\par - pt states that she had flu shot this season\par \par Lili Caldwell PGY2\par Discussed with Dr. Heredia

## 2019-12-31 DIAGNOSIS — R07.9 CHEST PAIN, UNSPECIFIED: ICD-10-CM

## 2019-12-31 DIAGNOSIS — E11.9 TYPE 2 DIABETES MELLITUS WITHOUT COMPLICATIONS: ICD-10-CM

## 2020-02-11 LAB — HEMOCCULT STL QL IA: NEGATIVE

## 2020-03-05 ENCOUNTER — FORM ENCOUNTER (OUTPATIENT)
Age: 55
End: 2020-03-05

## 2020-03-06 ENCOUNTER — APPOINTMENT (OUTPATIENT)
Dept: ULTRASOUND IMAGING | Facility: IMAGING CENTER | Age: 55
End: 2020-03-06
Payer: COMMERCIAL

## 2020-03-06 ENCOUNTER — OUTPATIENT (OUTPATIENT)
Dept: OUTPATIENT SERVICES | Facility: HOSPITAL | Age: 55
LOS: 1 days | End: 2020-03-06
Payer: SELF-PAY

## 2020-03-06 ENCOUNTER — APPOINTMENT (OUTPATIENT)
Dept: CV DIAGNOSITCS | Facility: HOSPITAL | Age: 55
End: 2020-03-06
Payer: COMMERCIAL

## 2020-03-06 ENCOUNTER — APPOINTMENT (OUTPATIENT)
Dept: MAMMOGRAPHY | Facility: IMAGING CENTER | Age: 55
End: 2020-03-06
Payer: COMMERCIAL

## 2020-03-06 DIAGNOSIS — E11.9 TYPE 2 DIABETES MELLITUS WITHOUT COMPLICATIONS: Chronic | ICD-10-CM

## 2020-03-06 DIAGNOSIS — N63.20 UNSPECIFIED LUMP IN THE LEFT BREAST, UNSPECIFIED QUADRANT: ICD-10-CM

## 2020-03-06 DIAGNOSIS — I10 ESSENTIAL (PRIMARY) HYPERTENSION: Chronic | ICD-10-CM

## 2020-03-06 PROCEDURE — 93350 STRESS TTE ONLY: CPT | Mod: 26

## 2020-03-06 PROCEDURE — 77063 BREAST TOMOSYNTHESIS BI: CPT

## 2020-03-06 PROCEDURE — 77067 SCR MAMMO BI INCL CAD: CPT | Mod: 26

## 2020-03-06 PROCEDURE — 93325 DOPPLER ECHO COLOR FLOW MAPG: CPT

## 2020-03-06 PROCEDURE — 77067 SCR MAMMO BI INCL CAD: CPT

## 2020-03-06 PROCEDURE — 93320 DOPPLER ECHO COMPLETE: CPT

## 2020-03-06 PROCEDURE — 93325 DOPPLER ECHO COLOR FLOW MAPG: CPT | Mod: 26

## 2020-03-06 PROCEDURE — 77063 BREAST TOMOSYNTHESIS BI: CPT | Mod: 26

## 2020-03-06 PROCEDURE — 93320 DOPPLER ECHO COMPLETE: CPT | Mod: 26

## 2020-03-06 PROCEDURE — C8930: CPT

## 2020-05-18 ENCOUNTER — APPOINTMENT (OUTPATIENT)
Dept: INTERNAL MEDICINE | Facility: CLINIC | Age: 55
End: 2020-05-18

## 2020-05-18 ENCOUNTER — OUTPATIENT (OUTPATIENT)
Dept: OUTPATIENT SERVICES | Facility: HOSPITAL | Age: 55
LOS: 1 days | End: 2020-05-18
Payer: COMMERCIAL

## 2020-05-18 DIAGNOSIS — L81.9 DISORDER OF PIGMENTATION, UNSPECIFIED: ICD-10-CM

## 2020-05-18 DIAGNOSIS — E11.9 TYPE 2 DIABETES MELLITUS WITHOUT COMPLICATIONS: Chronic | ICD-10-CM

## 2020-05-18 DIAGNOSIS — I10 ESSENTIAL (PRIMARY) HYPERTENSION: Chronic | ICD-10-CM

## 2020-05-18 PROCEDURE — G0463: CPT

## 2020-05-19 DIAGNOSIS — R07.9 CHEST PAIN, UNSPECIFIED: ICD-10-CM

## 2020-05-19 DIAGNOSIS — E11.9 TYPE 2 DIABETES MELLITUS WITHOUT COMPLICATIONS: ICD-10-CM

## 2020-05-19 DIAGNOSIS — I10 ESSENTIAL (PRIMARY) HYPERTENSION: ICD-10-CM

## 2020-05-22 NOTE — HISTORY OF PRESENT ILLNESS
[Verbal consent obtained from patient] : the patient, [unfilled] [FreeTextEntry8] : Ms. Dawn is a 56 y/o F with pmhx of HTN and DM2 with chief complaint of dark spot on right breast. Has hyperpigmentation on right breast, on the lateral side. Denies any pain at spot. Denies any lumps or masses near spot. She denies any secretions, redness, itchiness. She describes the spot as dime sized. it is not raised, states that it flush with the skin. Does not look like a mole. Patient denies any other skin changes. Patient states that she has been outside lately. She denies any new medications or supplements. \par Patient had a mammogram earlier this year that was negative. \par \par #Hyperpigmentation of Right breast\par - not raised per patient description, flush with skin; not in sun exposed area so less likely malignant\par - encouraged patient to monitor lesion, if begins to enlarge, become erythematous or painful to go to Urgent care \par - encourage to call dermatology office for virtual video visit, to see if biopsy is required for lesion; \par - D/w Dr. Rossi  \par \par Andreas PGY1 \par

## 2021-04-16 ENCOUNTER — OUTPATIENT (OUTPATIENT)
Dept: OUTPATIENT SERVICES | Facility: HOSPITAL | Age: 56
LOS: 1 days | End: 2021-04-16
Payer: COMMERCIAL

## 2021-04-16 ENCOUNTER — APPOINTMENT (OUTPATIENT)
Dept: MAMMOGRAPHY | Facility: IMAGING CENTER | Age: 56
End: 2021-04-16
Payer: COMMERCIAL

## 2021-04-16 DIAGNOSIS — E11.9 TYPE 2 DIABETES MELLITUS WITHOUT COMPLICATIONS: Chronic | ICD-10-CM

## 2021-04-16 DIAGNOSIS — Z00.8 ENCOUNTER FOR OTHER GENERAL EXAMINATION: ICD-10-CM

## 2021-04-16 DIAGNOSIS — I10 ESSENTIAL (PRIMARY) HYPERTENSION: Chronic | ICD-10-CM

## 2021-04-16 PROCEDURE — 77067 SCR MAMMO BI INCL CAD: CPT | Mod: 26

## 2021-04-16 PROCEDURE — 77063 BREAST TOMOSYNTHESIS BI: CPT

## 2021-04-16 PROCEDURE — 77067 SCR MAMMO BI INCL CAD: CPT

## 2021-04-16 PROCEDURE — 77063 BREAST TOMOSYNTHESIS BI: CPT | Mod: 26

## 2022-03-28 NOTE — ED ADULT TRIAGE NOTE - AS O2 DELIVERY
Patient not seen in some time.  Remains anemic;  Ferritin low last fall and Fe supplementation planned for 3 infusions; only 1 given  Will finish repletion and recheck    CRISTOBAL Rogel  
room air

## 2022-05-18 ENCOUNTER — APPOINTMENT (OUTPATIENT)
Dept: ULTRASOUND IMAGING | Facility: IMAGING CENTER | Age: 57
End: 2022-05-18
Payer: COMMERCIAL

## 2022-05-18 ENCOUNTER — APPOINTMENT (OUTPATIENT)
Dept: MAMMOGRAPHY | Facility: IMAGING CENTER | Age: 57
End: 2022-05-18
Payer: COMMERCIAL

## 2022-05-18 ENCOUNTER — OUTPATIENT (OUTPATIENT)
Dept: OUTPATIENT SERVICES | Facility: HOSPITAL | Age: 57
LOS: 1 days | End: 2022-05-18
Payer: COMMERCIAL

## 2022-05-18 DIAGNOSIS — E11.9 TYPE 2 DIABETES MELLITUS WITHOUT COMPLICATIONS: Chronic | ICD-10-CM

## 2022-05-18 DIAGNOSIS — Z00.8 ENCOUNTER FOR OTHER GENERAL EXAMINATION: ICD-10-CM

## 2022-05-18 DIAGNOSIS — I10 ESSENTIAL (PRIMARY) HYPERTENSION: Chronic | ICD-10-CM

## 2022-05-18 PROCEDURE — 77063 BREAST TOMOSYNTHESIS BI: CPT | Mod: 26

## 2022-05-18 PROCEDURE — 77067 SCR MAMMO BI INCL CAD: CPT | Mod: 26

## 2022-05-18 PROCEDURE — 77067 SCR MAMMO BI INCL CAD: CPT

## 2022-05-18 PROCEDURE — 76641 ULTRASOUND BREAST COMPLETE: CPT | Mod: 26,50

## 2022-05-18 PROCEDURE — 76641 ULTRASOUND BREAST COMPLETE: CPT

## 2022-05-18 PROCEDURE — 77063 BREAST TOMOSYNTHESIS BI: CPT

## 2022-05-24 ENCOUNTER — OUTPATIENT (OUTPATIENT)
Dept: OUTPATIENT SERVICES | Facility: HOSPITAL | Age: 57
LOS: 1 days | End: 2022-05-24
Payer: COMMERCIAL

## 2022-05-24 ENCOUNTER — APPOINTMENT (OUTPATIENT)
Dept: MAMMOGRAPHY | Facility: IMAGING CENTER | Age: 57
End: 2022-05-24
Payer: COMMERCIAL

## 2022-05-24 DIAGNOSIS — E11.9 TYPE 2 DIABETES MELLITUS WITHOUT COMPLICATIONS: Chronic | ICD-10-CM

## 2022-05-24 DIAGNOSIS — Z00.8 ENCOUNTER FOR OTHER GENERAL EXAMINATION: ICD-10-CM

## 2022-05-24 DIAGNOSIS — I10 ESSENTIAL (PRIMARY) HYPERTENSION: Chronic | ICD-10-CM

## 2022-05-24 PROCEDURE — G0279: CPT

## 2022-05-24 PROCEDURE — 77061 BREAST TOMOSYNTHESIS UNI: CPT | Mod: 26

## 2022-05-24 PROCEDURE — 77065 DX MAMMO INCL CAD UNI: CPT | Mod: 26,LT

## 2022-05-24 PROCEDURE — 77065 DX MAMMO INCL CAD UNI: CPT

## 2022-05-31 ENCOUNTER — RESULT REVIEW (OUTPATIENT)
Age: 57
End: 2022-05-31

## 2022-05-31 ENCOUNTER — OUTPATIENT (OUTPATIENT)
Dept: OUTPATIENT SERVICES | Facility: HOSPITAL | Age: 57
LOS: 1 days | End: 2022-05-31
Payer: COMMERCIAL

## 2022-05-31 ENCOUNTER — APPOINTMENT (OUTPATIENT)
Dept: MAMMOGRAPHY | Facility: IMAGING CENTER | Age: 57
End: 2022-05-31
Payer: COMMERCIAL

## 2022-05-31 DIAGNOSIS — I10 ESSENTIAL (PRIMARY) HYPERTENSION: Chronic | ICD-10-CM

## 2022-05-31 DIAGNOSIS — Z00.8 ENCOUNTER FOR OTHER GENERAL EXAMINATION: ICD-10-CM

## 2022-05-31 DIAGNOSIS — E11.9 TYPE 2 DIABETES MELLITUS WITHOUT COMPLICATIONS: Chronic | ICD-10-CM

## 2022-05-31 PROCEDURE — 88305 TISSUE EXAM BY PATHOLOGIST: CPT

## 2022-05-31 PROCEDURE — 88360 TUMOR IMMUNOHISTOCHEM/MANUAL: CPT | Mod: 26

## 2022-05-31 PROCEDURE — 77065 DX MAMMO INCL CAD UNI: CPT | Mod: 26,LT

## 2022-05-31 PROCEDURE — 19081 BX BREAST 1ST LESION STRTCTC: CPT

## 2022-05-31 PROCEDURE — 88305 TISSUE EXAM BY PATHOLOGIST: CPT | Mod: 26

## 2022-05-31 PROCEDURE — 77065 DX MAMMO INCL CAD UNI: CPT

## 2022-05-31 PROCEDURE — 19081 BX BREAST 1ST LESION STRTCTC: CPT | Mod: LT

## 2022-05-31 PROCEDURE — 88360 TUMOR IMMUNOHISTOCHEM/MANUAL: CPT

## 2022-06-03 ENCOUNTER — NON-APPOINTMENT (OUTPATIENT)
Age: 57
End: 2022-06-03

## 2022-06-07 PROBLEM — N63.20 BREAST MASS, LEFT: Status: ACTIVE | Noted: 2018-11-07

## 2022-06-08 ENCOUNTER — APPOINTMENT (OUTPATIENT)
Dept: SURGICAL ONCOLOGY | Facility: CLINIC | Age: 57
End: 2022-06-08
Payer: COMMERCIAL

## 2022-06-08 VITALS
OXYGEN SATURATION: 97 % | TEMPERATURE: 98.5 F | SYSTOLIC BLOOD PRESSURE: 148 MMHG | WEIGHT: 193 LBS | HEART RATE: 56 BPM | BODY MASS INDEX: 35.51 KG/M2 | HEIGHT: 62 IN | RESPIRATION RATE: 16 BRPM | DIASTOLIC BLOOD PRESSURE: 85 MMHG

## 2022-06-08 DIAGNOSIS — N63.20 UNSPECIFIED LUMP IN THE LEFT BREAST, UNSPECIFIED QUADRANT: ICD-10-CM

## 2022-06-08 DIAGNOSIS — Z80.3 FAMILY HISTORY OF MALIGNANT NEOPLASM OF BREAST: ICD-10-CM

## 2022-06-08 PROCEDURE — 99245 OFF/OP CONSLTJ NEW/EST HI 55: CPT

## 2022-06-08 PROCEDURE — 99215 OFFICE O/P EST HI 40 MIN: CPT

## 2022-06-08 NOTE — PAST MEDICAL HISTORY
[Postmenopausal] : The patient is postmenopausal [Menarche Age ____] : age at menarche was [unfilled] [Menopause Age____] : age at menopause was [unfilled] [History of Hormone Replacement Treatment] : has no history of hormone replacement treatment [Total Preg ___] : G[unfilled] [Live Births ___] : P[unfilled]  [Age At Live Birth ___] : Age at live birth: [unfilled] [FreeTextEntry6] : IVF x 1  [FreeTextEntry7] : 3 years [FreeTextEntry8] : none

## 2022-06-08 NOTE — PHYSICAL EXAM
[Normocephalic] : normocephalic [Atraumatic] : atraumatic [EOMI] : extra ocular movement intact [PERRL] : pupils equal, round and reactive to light [Sclera nonicteric] : sclera nonicteric [Supple] : supple [No Supraclavicular Adenopathy] : no supraclavicular adenopathy [No Cervical Adenopathy] : no cervical adenopathy [No Thyromegaly] : no thyromegaly [Examined in the supine and seated position] : examined in the supine and seated position [Bra Size: ___] : Bra Size: [unfilled] [Grade 2] : Ptosis Grade 2 [No dominant masses] : no dominant masses in right breast  [No dominant masses] : no dominant masses left breast [No Nipple Retraction] : no left nipple retraction [No Nipple Discharge] : no left nipple discharge [Breast Mass Right Breast ___cm] : no masses [Breast Mass Left Breast ___cm] : no masses [Breast Nipple Inversion] : nipples not inverted [Breast Nipple Retraction] : nipples not retracted [Breast Nipple Flattening] : nipples not flattened [Breast Nipple Fissures] : nipples not fissured [Breast Abnormal Lactation (Galactorrhea)] : no galactorrhea [Breast Abnormal Secretion Bloody Fluid] : no bloody discharge [Breast Abnormal Secretion Serous Fluid] : no serous discharge [Breast Abnormal Secretion Opalescent Fluid] : no milky discharge [No Axillary Lymphadenopathy] : no left axillary lymphadenopathy [No Edema] : no edema [No Rashes] : no rashes [No Ulceration] : no ulceration [de-identified] : non-labored respirations  [de-identified] : two parallel transverse keloids/scars medial and lateral to the NAC

## 2022-06-08 NOTE — CONSULT LETTER
[Dear  ___] : Dear  [unfilled], [Consult Letter:] : I had the pleasure of evaluating your patient, [unfilled]. [Please see my note below.] : Please see my note below. [Consult Closing:] : Thank you very much for allowing me to participate in the care of this patient.  If you have any questions, please do not hesitate to contact me. [Sincerely,] : Sincerely, [FreeTextEntry3] : Carissa Scott MD\par Breast Surgeon\par Division of Surgical Oncology\par Department of Surgery\par [address] [place] [address]\[place] [address] \par Tel: [phone]\par Fax: [phone]\par Email: [email or web address]\par

## 2022-06-08 NOTE — ASSESSMENT
[FreeTextEntry1] : The patient is a 57 year F referred by Dr. Magan Borges for consultation regarding left DCIS here for an initial visit.\par \par Left breast with DCIS; ER positive, 95%, strong nuclear staining, IA positive, 85%, strong nuclear staining\par \par We discussed her situation in the office today, first going through the implications of DCIS, a non-obligate breast cancer precursor. We expect that 15 to 50% will develop into cancer if left untreated. We discussed that 20% of these lesions will be upgraded on excision and this is another reason for surgical excision.\par \par An MRI is recommended for further evaluation of disease extent and the possible presence of multicentric or contralateral disease. \par  \par We discussed surgical options including mastectomy versus lumpectomy. After breast conserving surgery, we may see asymmetry in size. For lumpectomy, we would need to get an appropriate margin around the DCIS; this may require additional operations-the risk is approximately 15-20% for a need for re-excision.\par  \par Axillary staging is not generally necessary for DCIS. There are certain situations where it may be advisable including palpable DCIS, high-grade DCIS, or when a mastectomy is planned. We discussed the risk of lymphedema with a sentinel lymph node biopsy, which is about 5% of patients, most occurring within 3-5 years after surgery.\par \par She understands that radiation therapy and postoperative evaluation by Medical Oncology are integral parts of breast-conserving treatment and these will be addressed postoperatively. \par  \par We discussed the risks, benefits and limitations, and implications of genetic testing. We also discussed the psychosocial implications of genetic testing. Possible test results were reviewed along with associated medical management options. The Genetic Information Non-discrimination Act (JOEL) was also reviewed.\par \par The patient consented to genetic testing and blood was drawn and sent to [facility] today.\par  \par Preoperative, intraoperative, and postoperative considerations were reviewed. Risks, benefits, and alternatives to proposed surgical procedure were reviewed and all questions were answered to her satisfaction.\par \par Plan:\par  - MRI\par  - F/u GT\par - L breast lumpectomy and magseed localization (1 site)\par

## 2022-06-08 NOTE — HISTORY OF PRESENT ILLNESS
[FreeTextEntry1] : The patient is a 57 year F referred by Dr. Magan Borges for consultation regarding left DCIS here for an initial visit.\par \par Patient does not have a personal history of breast cancer. Benign left cyst aspiration in 2018. She reports that she has otherwise had normal breast imaging previously, no prior biopsies. \par \par Recent imaging:\par 5/18/2022 B/L SM, TC 11.6% (NW) revealed SFGD\par - L UOQ middle third shows new grouping of calcifications-> rec spot magnification \par - R negative \par - BR0\par \par 5/18/2022 B/L US - negative\par \par 5/24/2022 L DM (NW)\par - L UOQ middle depth calcifications consist of a small grouping of approximately 8 calcifications-> f/u stereo bx\par -BR4A\par \par 5/31/2022 L UOQ Stereo bx\par -L UOQ (top hat clip)- DCIS, cribriform pattern w/ intermediate grade nuclear atypia, microcalcifications present in DCIS, results are concordant.\par - ER 95%, NJ 85%, strong\par \par Patient has a past medical history of DM2, obesity, plantar fasciitis, HTN and HLD. Past surgical history include C section and laparoscopy for infertility.\par \par Patient has a family history of DM2 (mother) and CAD (mother, brother). Reports family history of breast cancer in 3 maternal cousins, age 30s, 30-40s, 60s. Her mother also had some kind of cancer, unknown.\par

## 2022-06-15 ENCOUNTER — APPOINTMENT (OUTPATIENT)
Dept: MRI IMAGING | Facility: CLINIC | Age: 57
End: 2022-06-15
Payer: COMMERCIAL

## 2022-06-15 PROCEDURE — A9585: CPT

## 2022-06-15 PROCEDURE — 77049 MRI BREAST C-+ W/CAD BI: CPT

## 2022-06-30 ENCOUNTER — OUTPATIENT (OUTPATIENT)
Dept: OUTPATIENT SERVICES | Facility: HOSPITAL | Age: 57
LOS: 1 days | End: 2022-06-30

## 2022-06-30 VITALS
HEIGHT: 63 IN | HEART RATE: 64 BPM | TEMPERATURE: 97 F | SYSTOLIC BLOOD PRESSURE: 131 MMHG | DIASTOLIC BLOOD PRESSURE: 79 MMHG | WEIGHT: 192.02 LBS | OXYGEN SATURATION: 99 % | RESPIRATION RATE: 16 BRPM

## 2022-06-30 DIAGNOSIS — E11.9 TYPE 2 DIABETES MELLITUS WITHOUT COMPLICATIONS: Chronic | ICD-10-CM

## 2022-06-30 DIAGNOSIS — I10 ESSENTIAL (PRIMARY) HYPERTENSION: ICD-10-CM

## 2022-06-30 DIAGNOSIS — I10 ESSENTIAL (PRIMARY) HYPERTENSION: Chronic | ICD-10-CM

## 2022-06-30 DIAGNOSIS — Z91.89 OTHER SPECIFIED PERSONAL RISK FACTORS, NOT ELSEWHERE CLASSIFIED: ICD-10-CM

## 2022-06-30 DIAGNOSIS — D05.12 INTRADUCTAL CARCINOMA IN SITU OF LEFT BREAST: ICD-10-CM

## 2022-06-30 DIAGNOSIS — E11.9 TYPE 2 DIABETES MELLITUS WITHOUT COMPLICATIONS: ICD-10-CM

## 2022-06-30 DIAGNOSIS — Z98.890 OTHER SPECIFIED POSTPROCEDURAL STATES: Chronic | ICD-10-CM

## 2022-06-30 DIAGNOSIS — Z98.891 HISTORY OF UTERINE SCAR FROM PREVIOUS SURGERY: Chronic | ICD-10-CM

## 2022-06-30 LAB
A1C WITH ESTIMATED AVERAGE GLUCOSE RESULT: 6.7 % — HIGH (ref 4–5.6)
ANION GAP SERPL CALC-SCNC: 11 MMOL/L — SIGNIFICANT CHANGE UP (ref 7–14)
BUN SERPL-MCNC: 20 MG/DL — SIGNIFICANT CHANGE UP (ref 7–23)
CALCIUM SERPL-MCNC: 9.6 MG/DL — SIGNIFICANT CHANGE UP (ref 8.4–10.5)
CHLORIDE SERPL-SCNC: 105 MMOL/L — SIGNIFICANT CHANGE UP (ref 98–107)
CO2 SERPL-SCNC: 22 MMOL/L — SIGNIFICANT CHANGE UP (ref 22–31)
CREAT SERPL-MCNC: 0.77 MG/DL — SIGNIFICANT CHANGE UP (ref 0.5–1.3)
EGFR: 89 ML/MIN/1.73M2 — SIGNIFICANT CHANGE UP
ESTIMATED AVERAGE GLUCOSE: 146 — SIGNIFICANT CHANGE UP
GLUCOSE SERPL-MCNC: 118 MG/DL — HIGH (ref 70–99)
HCT VFR BLD CALC: 40 % — SIGNIFICANT CHANGE UP (ref 34.5–45)
HGB BLD-MCNC: 13.4 G/DL — SIGNIFICANT CHANGE UP (ref 11.5–15.5)
MCHC RBC-ENTMCNC: 30.5 PG — SIGNIFICANT CHANGE UP (ref 27–34)
MCHC RBC-ENTMCNC: 33.5 GM/DL — SIGNIFICANT CHANGE UP (ref 32–36)
MCV RBC AUTO: 90.9 FL — SIGNIFICANT CHANGE UP (ref 80–100)
NRBC # BLD: 0 /100 WBCS — SIGNIFICANT CHANGE UP
NRBC # FLD: 0 K/UL — SIGNIFICANT CHANGE UP
PLATELET # BLD AUTO: 229 K/UL — SIGNIFICANT CHANGE UP (ref 150–400)
POTASSIUM SERPL-MCNC: 4.1 MMOL/L — SIGNIFICANT CHANGE UP (ref 3.5–5.3)
POTASSIUM SERPL-SCNC: 4.1 MMOL/L — SIGNIFICANT CHANGE UP (ref 3.5–5.3)
RBC # BLD: 4.4 M/UL — SIGNIFICANT CHANGE UP (ref 3.8–5.2)
RBC # FLD: 12.3 % — SIGNIFICANT CHANGE UP (ref 10.3–14.5)
SODIUM SERPL-SCNC: 138 MMOL/L — SIGNIFICANT CHANGE UP (ref 135–145)
WBC # BLD: 7.37 K/UL — SIGNIFICANT CHANGE UP (ref 3.8–10.5)
WBC # FLD AUTO: 7.37 K/UL — SIGNIFICANT CHANGE UP (ref 3.8–10.5)

## 2022-06-30 PROCEDURE — 93010 ELECTROCARDIOGRAM REPORT: CPT

## 2022-06-30 NOTE — H&P PST ADULT - HISTORY OF PRESENT ILLNESS
57 year old female presents to presurgical testing with diagnosis of left breast DCIS scheduled for left breast lumpectomy and magseed localization 1 site. Pt with an abnormal mammogram in 5/2022, s/p abnormal biopsy.

## 2022-06-30 NOTE — H&P PST ADULT - NSANTHOSAYNRD_GEN_A_CORE
No. JONI screening performed.  STOP BANG Legend: 0-2 = LOW Risk; 3-4 = INTERMEDIATE Risk; 5-8 = HIGH Risk

## 2022-06-30 NOTE — H&P PST ADULT - LIVES WITH, PROFILE
Detail Level: Zone
Photo Preface (Leave Blank If You Do Not Want): Photographs were obtained today
family and children/spouse

## 2022-06-30 NOTE — H&P PST ADULT - CARDIOVASCULAR
details… normal/regular rate and rhythm/S1 S2 present/no gallops/no rub/no murmur/no pedal edema/vascular

## 2022-06-30 NOTE — H&P PST ADULT - NSICDXFAMILYHX_GEN_ALL_CORE_FT
FAMILY HISTORY:  FH: breast cancer    Child  Still living? Unknown  Family history of seizure disorder, Age at diagnosis: Age Unknown

## 2022-06-30 NOTE — H&P PST ADULT - NSICDXPASTMEDICALHX_GEN_ALL_CORE_FT
PAST MEDICAL HISTORY:  DCIS (ductal carcinoma in situ) of breast left    DM (diabetes mellitus) type 2    HLD (hyperlipidemia)     HTN (hypertension)

## 2022-06-30 NOTE — H&P PST ADULT - RESPIRATORY
normal/clear to auscultation bilaterally/no wheezes/no rales/no rhonchi/airway patent/breath sounds equal/good air movement/respirations non-labored

## 2022-06-30 NOTE — H&P PST ADULT - PROBLEM SELECTOR PLAN 2
Patient instructed to take lisinopril with a sip of water on the morning of procedure.    stress echo 2020 report in chart

## 2022-06-30 NOTE — H&P PST ADULT - PROBLEM SELECTOR PLAN 1
Patient tentatively scheduled for  left breast lumpectomy and magseed localization 1 site for 7/7/22. Pre-op instructions provided. Pt given verbal and written instructions with teach back on chlorhexidine shampoo and pepcid. Pt verbalized understanding with return demonstration.     Pt instructed to obtain a COVID-19 PCR 3-5 days prior to the procedure. COVID-19 PCR order placed. Pt was provided with a list of COVID-19 PCR swabbing locations and hours of operation. Pt stated understanding.    58 y/o F with HTN DM type 2 under control with good exercise capacity scheduled for a low risk procedure. No further evaluations requested.

## 2022-07-05 ENCOUNTER — RESULT REVIEW (OUTPATIENT)
Age: 57
End: 2022-07-05

## 2022-07-05 ENCOUNTER — OUTPATIENT (OUTPATIENT)
Dept: OUTPATIENT SERVICES | Facility: HOSPITAL | Age: 57
LOS: 1 days | End: 2022-07-05
Payer: COMMERCIAL

## 2022-07-05 ENCOUNTER — APPOINTMENT (OUTPATIENT)
Dept: MAMMOGRAPHY | Facility: IMAGING CENTER | Age: 57
End: 2022-07-05

## 2022-07-05 DIAGNOSIS — Z00.8 ENCOUNTER FOR OTHER GENERAL EXAMINATION: ICD-10-CM

## 2022-07-05 DIAGNOSIS — Z98.891 HISTORY OF UTERINE SCAR FROM PREVIOUS SURGERY: Chronic | ICD-10-CM

## 2022-07-05 DIAGNOSIS — Z98.890 OTHER SPECIFIED POSTPROCEDURAL STATES: Chronic | ICD-10-CM

## 2022-07-05 PROBLEM — D05.10 INTRADUCTAL CARCINOMA IN SITU OF UNSPECIFIED BREAST: Chronic | Status: ACTIVE | Noted: 2022-06-30

## 2022-07-05 PROBLEM — E11.9 TYPE 2 DIABETES MELLITUS WITHOUT COMPLICATIONS: Chronic | Status: ACTIVE | Noted: 2019-12-14

## 2022-07-05 PROBLEM — E78.5 HYPERLIPIDEMIA, UNSPECIFIED: Chronic | Status: ACTIVE | Noted: 2022-06-30

## 2022-07-05 LAB — SARS-COV-2 RNA SPEC QL NAA+PROBE: SIGNIFICANT CHANGE UP

## 2022-07-05 PROCEDURE — 19281 PERQ DEVICE BREAST 1ST IMAG: CPT | Mod: LT

## 2022-07-05 PROCEDURE — 19281 PERQ DEVICE BREAST 1ST IMAG: CPT

## 2022-07-05 PROCEDURE — C1739: CPT

## 2022-07-06 ENCOUNTER — TRANSCRIPTION ENCOUNTER (OUTPATIENT)
Age: 57
End: 2022-07-06

## 2022-07-07 ENCOUNTER — APPOINTMENT (OUTPATIENT)
Dept: MAMMOGRAPHY | Facility: IMAGING CENTER | Age: 57
End: 2022-07-07

## 2022-07-07 ENCOUNTER — APPOINTMENT (OUTPATIENT)
Dept: NUCLEAR MEDICINE | Facility: IMAGING CENTER | Age: 57
End: 2022-07-07

## 2022-07-07 ENCOUNTER — OUTPATIENT (OUTPATIENT)
Dept: OUTPATIENT SERVICES | Facility: HOSPITAL | Age: 57
LOS: 1 days | End: 2022-07-07
Payer: COMMERCIAL

## 2022-07-07 ENCOUNTER — TRANSCRIPTION ENCOUNTER (OUTPATIENT)
Age: 57
End: 2022-07-07

## 2022-07-07 ENCOUNTER — RESULT REVIEW (OUTPATIENT)
Age: 57
End: 2022-07-07

## 2022-07-07 ENCOUNTER — APPOINTMENT (OUTPATIENT)
Dept: SURGICAL ONCOLOGY | Facility: AMBULATORY SURGERY CENTER | Age: 57
End: 2022-07-07

## 2022-07-07 ENCOUNTER — OUTPATIENT (OUTPATIENT)
Dept: OUTPATIENT SERVICES | Facility: HOSPITAL | Age: 57
LOS: 1 days | Discharge: HOME | End: 2022-07-07

## 2022-07-07 VITALS
DIASTOLIC BLOOD PRESSURE: 60 MMHG | RESPIRATION RATE: 16 BRPM | OXYGEN SATURATION: 100 % | HEART RATE: 60 BPM | SYSTOLIC BLOOD PRESSURE: 124 MMHG

## 2022-07-07 VITALS
WEIGHT: 192.02 LBS | TEMPERATURE: 97 F | RESPIRATION RATE: 18 BRPM | HEIGHT: 63 IN | HEART RATE: 63 BPM | OXYGEN SATURATION: 100 % | DIASTOLIC BLOOD PRESSURE: 67 MMHG | SYSTOLIC BLOOD PRESSURE: 139 MMHG

## 2022-07-07 DIAGNOSIS — Z98.890 OTHER SPECIFIED POSTPROCEDURAL STATES: Chronic | ICD-10-CM

## 2022-07-07 DIAGNOSIS — D05.12 INTRADUCTAL CARCINOMA IN SITU OF LEFT BREAST: ICD-10-CM

## 2022-07-07 DIAGNOSIS — Z00.8 ENCOUNTER FOR OTHER GENERAL EXAMINATION: ICD-10-CM

## 2022-07-07 DIAGNOSIS — Z98.891 HISTORY OF UTERINE SCAR FROM PREVIOUS SURGERY: Chronic | ICD-10-CM

## 2022-07-07 LAB — GLUCOSE BLDC GLUCOMTR-MCNC: 129 MG/DL — HIGH (ref 70–99)

## 2022-07-07 PROCEDURE — 38792 RA TRACER ID OF SENTINL NODE: CPT | Mod: 59,LT

## 2022-07-07 PROCEDURE — 88307 TISSUE EXAM BY PATHOLOGIST: CPT | Mod: 26

## 2022-07-07 PROCEDURE — 38900 IO MAP OF SENT LYMPH NODE: CPT | Mod: LT

## 2022-07-07 PROCEDURE — 76098 X-RAY EXAM SURGICAL SPECIMEN: CPT

## 2022-07-07 PROCEDURE — 76098 X-RAY EXAM SURGICAL SPECIMEN: CPT | Mod: 26

## 2022-07-07 PROCEDURE — 19301 PARTIAL MASTECTOMY: CPT | Mod: LT

## 2022-07-07 PROCEDURE — 38525 BIOPSY/REMOVAL LYMPH NODES: CPT | Mod: LT

## 2022-07-07 PROCEDURE — 88305 TISSUE EXAM BY PATHOLOGIST: CPT | Mod: 26

## 2022-07-07 PROCEDURE — A9541: CPT

## 2022-07-07 RX ORDER — ACETAMINOPHEN 500 MG
2 TABLET ORAL
Qty: 0 | Refills: 0 | DISCHARGE
Start: 2022-07-07

## 2022-07-07 RX ORDER — ATORVASTATIN CALCIUM 80 MG/1
1 TABLET, FILM COATED ORAL
Qty: 0 | Refills: 0 | DISCHARGE

## 2022-07-07 RX ORDER — METFORMIN HYDROCHLORIDE 850 MG/1
1 TABLET ORAL
Qty: 0 | Refills: 0 | DISCHARGE

## 2022-07-07 RX ORDER — ACETAMINOPHEN 500 MG
650 TABLET ORAL ONCE
Refills: 0 | Status: DISCONTINUED | OUTPATIENT
Start: 2022-07-07 | End: 2022-07-21

## 2022-07-07 RX ORDER — LISINOPRIL 2.5 MG/1
1 TABLET ORAL
Qty: 0 | Refills: 0 | DISCHARGE

## 2022-07-07 NOTE — ASU DISCHARGE PLAN (ADULT/PEDIATRIC) - CARE PROVIDER_API CALL
Carissa Scott)  Surgery  [address] [place] [id number]  Phone: [phone]  Fax: [phone]  Follow Up Time: 2 weeks

## 2022-07-07 NOTE — ASU DISCHARGE PLAN (ADULT/PEDIATRIC) - NS MD DC FALL RISK RISK
For information on Fall & Injury Prevention, visit: [email or web address] OR  [email or web address] OR  https://www.cdc.gov/steadi/patient.html

## 2022-07-07 NOTE — BRIEF OPERATIVE NOTE - OPERATION/FINDINGS
Left breast lumpectomy with SLNB. Removed two lymph nodes from left axilla. Otherwise procedure as planned.

## 2022-07-07 NOTE — ASU DISCHARGE PLAN (ADULT/PEDIATRIC) - NURSING INSTRUCTIONS
DO NOT take any Tylenol (Acetaminophen) or narcotics containing Tylenol until after 6:15pm. You received Tylenol during your operation and it can cause damage to your liver if too much is taken within a 24 hour time period.

## 2022-07-07 NOTE — BRIEF OPERATIVE NOTE - SPECIMENS
Left axillary lymph node x2, left breast excision, anterior margin, deep margin, inferior margin, superior margin, lateral margin, medial margin

## 2022-07-07 NOTE — ASU PREOPERATIVE ASSESSMENT, ADULT (IPARK ONLY) - FALL HARM RISK - UNIVERSAL INTERVENTIONS
Bed in lowest position, wheels locked, appropriate side rails in place/Call bell, personal items and telephone in reach/Instruct patient to call for assistance before getting out of bed or chair/Non-slip footwear when patient is out of bed/[place] to call system/Physically safe environment - no spills, clutter or unnecessary equipment/Purposeful Proactive Rounding/Room/bathroom lighting operational, light cord in reach

## 2022-07-14 LAB — SURGICAL PATHOLOGY STUDY: SIGNIFICANT CHANGE UP

## 2022-07-20 ENCOUNTER — APPOINTMENT (OUTPATIENT)
Dept: SURGICAL ONCOLOGY | Facility: CLINIC | Age: 57
End: 2022-07-20

## 2022-07-20 VITALS
BODY MASS INDEX: 35.33 KG/M2 | HEART RATE: 58 BPM | OXYGEN SATURATION: 99 % | HEIGHT: 62 IN | WEIGHT: 192 LBS | DIASTOLIC BLOOD PRESSURE: 80 MMHG | RESPIRATION RATE: 17 BRPM | SYSTOLIC BLOOD PRESSURE: 119 MMHG | TEMPERATURE: 97.6 F

## 2022-07-20 PROCEDURE — 99024 POSTOP FOLLOW-UP VISIT: CPT

## 2022-07-26 ENCOUNTER — OUTPATIENT (OUTPATIENT)
Dept: OUTPATIENT SERVICES | Facility: HOSPITAL | Age: 57
LOS: 1 days | Discharge: HOME | End: 2022-07-26

## 2022-07-26 DIAGNOSIS — C50.919 MALIGNANT NEOPLASM OF UNSPECIFIED SITE OF UNSPECIFIED FEMALE BREAST: ICD-10-CM

## 2022-07-26 DIAGNOSIS — Z98.891 HISTORY OF UTERINE SCAR FROM PREVIOUS SURGERY: Chronic | ICD-10-CM

## 2022-07-26 DIAGNOSIS — Z98.890 OTHER SPECIFIED POSTPROCEDURAL STATES: Chronic | ICD-10-CM

## 2022-07-26 DIAGNOSIS — D05.12 INTRADUCTAL CARCINOMA IN SITU OF LEFT BREAST: ICD-10-CM

## 2022-08-02 ENCOUNTER — APPOINTMENT (OUTPATIENT)
Dept: RADIATION ONCOLOGY | Facility: CLINIC | Age: 57
End: 2022-08-02

## 2022-08-02 VITALS
DIASTOLIC BLOOD PRESSURE: 87 MMHG | OXYGEN SATURATION: 100 % | SYSTOLIC BLOOD PRESSURE: 143 MMHG | RESPIRATION RATE: 17 BRPM | HEART RATE: 62 BPM

## 2022-08-02 PROCEDURE — 99204 OFFICE O/P NEW MOD 45 MIN: CPT | Mod: 25

## 2022-08-02 NOTE — REVIEW OF SYSTEMS
[Negative] : Allergic/Immunologic
55 y.o. female, domiciled with  with PPHx of substance use disorder (Xanax and alcohol), anxiety, and depression with history of 2 past suicide attempts, last in 2/2018 - sent to inpatient psych, now follows up outpatient with Lisa Maynard, pmhx of cholecystectomy secondary to stones presents to the er with 2-3 day history of 10 out of 10 ruq abd pain similar to the pain in the past. Pt was worked up in the er and found to have choledocholithiasis and is planned for urgent ercp. Pt was given IV dilaudid and lorazepam 1mg, then had a period of confusion, hallucinations and made statement about killing self if she was given something to eat/drink. Also concern for benzo withdrawal given hx of abuse.  Pt now a&ox3, mood "ok", speech slowed, responses latent; with noted difficulty in recollection. She doesn't remember making the suicidal statement; recent mood has been well and stable. No longer experiencing si/avh.  reports she has cleared significantly in past 24hrs.    benzo withdrawal delirium vs. medication induced or combination of both    recommend:  1) continue 1:1 supervison as precaution; no active SI but still lingering confusion  2) hold benzos  3) restart paxil 30mg daily, wellbutrin xl 150mg daily and lamictal 25mg daily  4) psych will follow up tomorrow for clearance

## 2022-08-02 NOTE — ASSESSMENT
[FreeTextEntry1] : The patient is a 57 year F referred by Dr. Magan Borges for consultation regarding left DCIS s/p L breast lumpectomy and L SLNB on 7/7/2022 here for postop visit\par \par 7/7/2022 L lumpectomy and L SLNB pathology\par - L SLNB: negative (0/2)\par - L breast DCIS, cribriform and solid pattern with low to intermediate nuclear atypia, negative resection margins\par - L inferior margin focal DCIS, cribriform type with low nuclear grade measuring approx. 2 mm in greatest dimension and extends to  2 mm from inked margin\par - L superior, medial, lateral, deep and anterior margin benign\par - [id number] \par \par Exam today shows no evidence of infection in either incision. Seroma noted in axilla, attempt to drain with needle unsuccessful. Two attempts made with approximately 5 cc of bloody fluid returned only. Pressure held and bandaid placed on area.\par \par Plan:\par  - Refer to medical oncology\par  - Refer to radiation oncology\par  - Next /US 5/2023\par  - RTO 3 months\par

## 2022-08-02 NOTE — DATA REVIEWED
[FreeTextEntry1] : 6/8/2022 [facility] GT\par 6/15/2022 MRI \par 7/7/2022 L lumpectomy and L SLNB pathology\par

## 2022-08-02 NOTE — HISTORY OF PRESENT ILLNESS
[FreeTextEntry1] : The patient is a 57 year F referred by Dr. Magan Borges for consultation regarding left DCIS s/p L breast lumpectomy and L SLNB on 7/7/2022 here for postop visit.\par \par Patient does not have a personal history of breast cancer. Benign left cyst aspiration in 2018. She reports that she has otherwise had normal breast imaging previously, no prior biopsies. \par \par Recent imaging:\par 5/18/2022 B/L SM, TC 11.6% (NW) revealed SFGD\par - L UOQ middle third shows new grouping of calcifications-> rec spot magnification \par - R negative \par - BR0\par \par 5/18/2022 B/L US - negative\par \par 5/24/2022 L DM (NW)\par - L UOQ middle depth calcifications consist of a small grouping of approximately 8 calcifications-> f/u stereo bx\par -BR4A\par \par 5/31/2022 L UOQ Stereo bx\par -L UOQ (top hat clip)- DCIS, cribriform pattern w/ intermediate grade nuclear atypia, microcalcs present in DCIS.\par - ER 95%, OR 85%, strong\par \par Patient has a past medical history of DM2, obesity, plantar fasciitis, HTN and HLD. Past surgical history include C section and laparoscopy for infertility.\par \par Patient has a family history of DM2 (mother) and CAD (mother, brother). Reports family history of breast cancer in 3 maternal cousins, age 30s, 30-40s, 60s. Her mother also had some kind of cancer, unknown.\par \par 6/8/2022 [facility] GT for 9 gene panel: negative\par \par 6/15/2022 MRI (NW)\par - B/L scattered enhancing nonspecific foci\par - L UOQ middle depth: a focus of susceptibility artifact compatible with biopsy marker with no significant suspicious associated enhancement. Minimal post biopsy change is seen along the biopsy tract extending superior from the marker placed during stereotactic guided biopsy demonstrating DCIS.\par - BR6\par \par 7/7/2022 L lumpectomy and L SLNB pathology\par - L breast DCIS, cribriform and solid pattern with low to intermediate nuclear atypia, negative resection margins\par - L inferior margin focal DCIS, cribriform type with low nuclear grade measuring approx. 2 mm in greatest dimension and extends to  2 mm from inked margin\par - Other margins negative\par - L SLNB: negative (0/2)\par - [id number] \par \par Post-op visit:\par She notes that she had less pain initially after surgery, but then started to notice more discomfort from her underarm area this past week. Denies redness to the area, denies fevers. Thinks she had some chills over the weekend.

## 2022-08-02 NOTE — CONSULT LETTER
[Dear  ___] : Dear  [unfilled], [Courtesy Letter:] : I had the pleasure of seeing your patient, [unfilled], in my office today. [Please see my note below.] : Please see my note below. [Consult Closing:] : Thank you very much for allowing me to participate in the care of this patient.  If you have any questions, please do not hesitate to contact me. [Sincerely,] : Sincerely, [FreeTextEntry3] : Carissa Scott MD\par Breast Surgeon\par Division of Surgical Oncology\par Department of Surgery\par [address] [place] [address]\[place] [address] \par Tel: [phone]\par Fax: [phone]\par Email: [email or web address]\par

## 2022-08-02 NOTE — PHYSICAL EXAM
[Normocephalic] : normocephalic [Atraumatic] : atraumatic [EOMI] : extra ocular movement intact [PERRL] : pupils equal, round and reactive to light [Sclera nonicteric] : sclera nonicteric [Bra Size: ___] : Bra Size: [unfilled] [Grade 2] : Ptosis Grade 2 [No Edema] : no edema [No Rashes] : no rashes [No Ulceration] : no ulceration [de-identified] : non-labored respirations  [de-identified] : two parallel transverse keloids/scars medial and lateral to the NAC\par superior circumareolar incision healing well; no erythema, no fluctuance [de-identified] : left incision healing well, no erythema. Fluctuance noted in axilla

## 2022-08-04 ENCOUNTER — APPOINTMENT (OUTPATIENT)
Dept: HEMATOLOGY ONCOLOGY | Facility: CLINIC | Age: 57
End: 2022-08-04

## 2022-08-04 ENCOUNTER — RESULT REVIEW (OUTPATIENT)
Age: 57
End: 2022-08-04

## 2022-08-04 ENCOUNTER — NON-APPOINTMENT (OUTPATIENT)
Age: 57
End: 2022-08-04

## 2022-08-04 VITALS
BODY MASS INDEX: 36.57 KG/M2 | RESPIRATION RATE: 18 BRPM | DIASTOLIC BLOOD PRESSURE: 80 MMHG | OXYGEN SATURATION: 98 % | TEMPERATURE: 97 F | WEIGHT: 196.21 LBS | HEIGHT: 61.42 IN | SYSTOLIC BLOOD PRESSURE: 119 MMHG | HEART RATE: 64 BPM

## 2022-08-04 DIAGNOSIS — U07.1 COVID-19: ICD-10-CM

## 2022-08-04 DIAGNOSIS — Z98.890 OTHER SPECIFIED POSTPROCEDURAL STATES: ICD-10-CM

## 2022-08-04 LAB
ALBUMIN SERPL ELPH-MCNC: 4.8 G/DL
ALP BLD-CCNC: 87 U/L
ALT SERPL-CCNC: 13 U/L
ANION GAP SERPL CALC-SCNC: 10 MMOL/L
AST SERPL-CCNC: 14 U/L
BASOPHILS # BLD AUTO: 0.02 K/UL — SIGNIFICANT CHANGE UP (ref 0–0.2)
BASOPHILS NFR BLD AUTO: 0.4 % — SIGNIFICANT CHANGE UP (ref 0–2)
BILIRUB SERPL-MCNC: 0.6 MG/DL
BUN SERPL-MCNC: 19 MG/DL
CALCIUM SERPL-MCNC: 9.8 MG/DL
CHLORIDE SERPL-SCNC: 105 MMOL/L
CO2 SERPL-SCNC: 24 MMOL/L
CREAT SERPL-MCNC: 0.79 MG/DL
EGFR: 87 ML/MIN/1.73M2
EOSINOPHIL # BLD AUTO: 0.31 K/UL — SIGNIFICANT CHANGE UP (ref 0–0.5)
EOSINOPHIL NFR BLD AUTO: 5.6 % — SIGNIFICANT CHANGE UP (ref 0–6)
GLUCOSE SERPL-MCNC: 119 MG/DL
HCT VFR BLD CALC: 38.3 % — SIGNIFICANT CHANGE UP (ref 34.5–45)
HGB BLD-MCNC: 12.8 G/DL — SIGNIFICANT CHANGE UP (ref 11.5–15.5)
IMM GRANULOCYTES NFR BLD AUTO: 0.2 % — SIGNIFICANT CHANGE UP (ref 0–1.5)
LYMPHOCYTES # BLD AUTO: 1.9 K/UL — SIGNIFICANT CHANGE UP (ref 1–3.3)
LYMPHOCYTES # BLD AUTO: 34.2 % — SIGNIFICANT CHANGE UP (ref 13–44)
MCHC RBC-ENTMCNC: 31.2 PG — SIGNIFICANT CHANGE UP (ref 27–34)
MCHC RBC-ENTMCNC: 33.4 G/DL — SIGNIFICANT CHANGE UP (ref 32–36)
MCV RBC AUTO: 93.4 FL — SIGNIFICANT CHANGE UP (ref 80–100)
MONOCYTES # BLD AUTO: 0.4 K/UL — SIGNIFICANT CHANGE UP (ref 0–0.9)
MONOCYTES NFR BLD AUTO: 7.2 % — SIGNIFICANT CHANGE UP (ref 2–14)
NEUTROPHILS # BLD AUTO: 2.92 K/UL — SIGNIFICANT CHANGE UP (ref 1.8–7.4)
NEUTROPHILS NFR BLD AUTO: 52.4 % — SIGNIFICANT CHANGE UP (ref 43–77)
NRBC # BLD: 0 /100 WBCS — SIGNIFICANT CHANGE UP (ref 0–0)
PLATELET # BLD AUTO: 224 K/UL — SIGNIFICANT CHANGE UP (ref 150–400)
POTASSIUM SERPL-SCNC: 4.7 MMOL/L
PROT SERPL-MCNC: 6.8 G/DL
RBC # BLD: 4.1 M/UL — SIGNIFICANT CHANGE UP (ref 3.8–5.2)
RBC # FLD: 12.5 % — SIGNIFICANT CHANGE UP (ref 10.3–14.5)
SODIUM SERPL-SCNC: 140 MMOL/L
WBC # BLD: 5.56 K/UL — SIGNIFICANT CHANGE UP (ref 3.8–10.5)
WBC # FLD AUTO: 5.56 K/UL — SIGNIFICANT CHANGE UP (ref 3.8–10.5)

## 2022-08-04 PROCEDURE — 99244 OFF/OP CNSLTJ NEW/EST MOD 40: CPT

## 2022-08-04 NOTE — ASSESSMENT
[FreeTextEntry1] : Mammogram, breast MRI, pathology reports reviewed.\par 7/7/2022-Left breast DCIS ER+/MN+, s/p left BCS/SLND.\par Reviewed diagnosis/prognosis/treatment options.\par Discussed roles of surgery/radiation/systemic therapy in breast DCIS management.\par Patient has opted for BCS. She is a candidate for adjuvant radiation and has seen Dr. Rausch, with decision in progress.\par With ER+/MN+ disease, she is also a candidate for endocrine therapy-alternatives reviewed with respective pro's/con's. Patient consents to anastrozole-potential side effects explained including but not limited to arthralgias, osteoporosis, vascular toxicity.\par Patient would start AI post radiation, if RT given.\par Suggest updating her BDT.

## 2022-08-04 NOTE — HISTORY OF PRESENT ILLNESS
[Disease: _____________________] : Disease: [unfilled] [T: ___] : T[unfilled] [N: ___] : N[unfilled] [AJCC Stage: ____] : AJCC Stage: [unfilled] [de-identified] : Patient presenting at the request of her breast surgeon for oncology consultation for left breast DCIS.\par \par 5/18/22 B/L mammogram-showed left upper outer quadrant middle third with new grouping of calcifications. Spot magnification recommended. US 5/18/22 was negative. Diagnostic mammogram on 5/24/2022 showed left upper outer quadrant with small grouping of approximately 8 calcifications, biopsy recommended.\par \par 5/31/2022-She underwent biopsy of the left breast UOQ. Pathology showed DCIS, cribriform pattern, intermediate grade. ER (95%, strong), CT 85% (85%, strong).\par \par 6/15/2022-MRI breasts demonstrates bilateral scattered enhancing nonspecific foci. L UOQ middle depth with a focus of susceptibility artifact compatible with biopsy marker with no significant suspicious associated enhancement. \par \par 7/7/2022-She underwent left lumpectomy and SLNB. Pathology shows DCIS, cribriform and solid pattern with low to intermediate nuclear atypia, negative resection margins. Left inferior margin with focal DCIS, cribriform type with low nuclear grade measuring approx. 2 mm in greatest dimension and extends to 2 mm from inked margin. Negative LN's (0/2).\par Other margins negative. L SLNB: negative (0/2)--[id number] \par \par No pulmonary/GI//flow-limiting/neurologic complaints.  No fevers.  No abnormal bruising/bleeding reported.\par \par Had COVID vaccines (Moderna) x2, and 1 booster.\par  [de-identified] : DCIS [de-identified] : ER+/ND+ [de-identified] : 6/13/22-[facility] 9 gene panel negative (including BRCA1 and 2).

## 2022-08-04 NOTE — CONSULT LETTER
[Dear  ___] : Dear  [unfilled], [Consult Letter:] : I had the pleasure of evaluating your patient, [unfilled]. [Please see my note below.] : Please see my note below. [Consult Closing:] : Thank you very much for allowing me to participate in the care of this patient.  If you have any questions, please do not hesitate to contact me. [Sincerely,] : Sincerely, [DrMiquel  ___] : Dr. MILLER [FreeTextEntry3] : Dot Washington MD

## 2022-08-04 NOTE — PHYSICAL EXAM
[Fully active, able to carry on all pre-disease performance without restriction] : Status 0 - Fully active, able to carry on all pre-disease performance without restriction [Normal] : affect appropriate [de-identified] : no dominant mass, nipple retraction or discharge

## 2022-08-07 NOTE — LETTER CLOSING
[Consult Closing:] : Thank you for allowing me to participate in the care of this patient.  If you have any questions, please do not hesitate to contact me. [Sincerely yours,] : Sincerely yours, [FreeTextEntry3] : Cheryl Rausch MD\par

## 2022-08-07 NOTE — VITALS
[8 - Distress Level] : Distress Level: 8 [Maximal Pain Intensity: 5/10] : 5/10 [Least Pain Intensity: 0/10] : 0/10 [Pain Duration: ___] : Pain duration: [unfilled] [Pain Location: ___] : Pain Location: [unfilled] [OTC] : OTC [80: Normal activity with effort; some signs or symptoms of disease.] : 80: Normal activity with effort; some signs or symptoms of disease.  [Pain Interferes with ADLs] : Pain does not interfere with activities of daily living [FreeTextEntry7] : offered , patient refused

## 2022-08-07 NOTE — PHYSICAL EXAM
[Sclera] : the sclera and conjunctiva were normal [Outer Ear] : the ears and nose were normal in appearance [No UE Edema] : there is no upper extremity edema [] : no rash [No Focal Deficits] : no focal deficits [Heart Rate And Rhythm] : heart rate and rhythm were normal [Abdomen Soft] : soft [Nondistended] : nondistended [Normal] : no palpable adenopathy [Axillary Lymph Nodes Enlarged Bilaterally] : axillary [de-identified] : L lumpectomy and axillary incisions intact, no erythema. Mild soft swelling of the region.

## 2022-08-07 NOTE — HISTORY OF PRESENT ILLNESS
[FreeTextEntry1] : Ms. Dawn is a 58 yo female with left DCIS, s/p lumpectomy and SLNB on 7/7/2022 and presents for radiotherapy recommendations.\par \par She had been undergoing routine screening mammography. She had a screening mammogram on 3/6/20 that showed no evidence of malignancy. Her next screening mammogram on 5/18/22 showed left upper outer quadrant middle third with new grouping of calcifications. Spot magnification recommended. US 5/18/22 was negative. Diagnostic mammogram on 5/24/2022 showed left upper outer quadrant with small grouping of approximately 8 calcifications, biopsy recommended.\par \par She underwent biopsy of the left breast UOQ on 5/31/22. Pathology showed DCIS, cribriform pattern, intermediate grade. ER 95%, LA 85%.\par \par MRI breasts 6/15/22 demonstrated bilateral scattered enhancing nonspecific foci. L UOQ middle depth with a focus of susceptibility artifact compatible with biopsy marker with no significant suspicious associated enhancement. \par \par She underwent left lumpectomy and SLNB on 7/7/2022. Pathology showed DCIS, cribriform and solid pattern with low to intermediate nuclear atypia, measuring at least 3 mm. The DCIS was present in 8 out of 44 blocks. The resection margins were negative. Additional shaved margins were obtained. The left inferior margin contained focal DCIS, cribriform type with low nuclear grade measuring approximately 2 mm in greatest dimension and extended to 2 mm from inked margin. The remaining margins were benign. Two left axillary sentinel lymph node were negative.\par \par Interval history: She presents for treatment recommendations. Genetic testing on 6/8/22 with [facility] Breast cancer panel was negative. Family history positive for 3 cousins with breast cancer. She is healing well s/p surgery with mild residual discomfort and numbness in the left axillary region.

## 2022-08-08 ENCOUNTER — NON-APPOINTMENT (OUTPATIENT)
Age: 57
End: 2022-08-08

## 2022-08-18 ENCOUNTER — NON-APPOINTMENT (OUTPATIENT)
Age: 57
End: 2022-08-18

## 2022-09-07 ENCOUNTER — APPOINTMENT (OUTPATIENT)
Dept: HEMATOLOGY ONCOLOGY | Facility: CLINIC | Age: 57
End: 2022-09-07

## 2022-09-07 VITALS
TEMPERATURE: 97.1 F | BODY MASS INDEX: 37.19 KG/M2 | OXYGEN SATURATION: 99 % | WEIGHT: 199.51 LBS | SYSTOLIC BLOOD PRESSURE: 135 MMHG | HEART RATE: 72 BPM | RESPIRATION RATE: 17 BRPM | DIASTOLIC BLOOD PRESSURE: 84 MMHG

## 2022-09-07 VITALS
BODY MASS INDEX: 37.19 KG/M2 | DIASTOLIC BLOOD PRESSURE: 84 MMHG | SYSTOLIC BLOOD PRESSURE: 135 MMHG | HEART RATE: 72 BPM | WEIGHT: 199.52 LBS | TEMPERATURE: 97.1 F | OXYGEN SATURATION: 99 % | RESPIRATION RATE: 17 BRPM

## 2022-09-07 PROCEDURE — 99214 OFFICE O/P EST MOD 30 MIN: CPT

## 2022-09-07 NOTE — ASSESSMENT
[FreeTextEntry1] : Lab work reviewed.\par 7/7/2022-Left breast DCIS ER+/IN+, s/p left BCS/SLND.\par Patient has opted for BCS. \par 8/9/2022-DCISionRT score 0.8 (low risk)-saw Dr. Rausch (radiation oncology)-decided against RT.\par With ER+/IN+ disease, she is a candidate for endocrine therapy-alternatives reviewed with respective pro's/con's. Patient consents to anastrozole-potential side effects explained including but not limited to arthralgias, osteoporosis, vascular toxicity.\par \par Suggest updating her BDT-ordered.\par \par To f/u with PCP for HTN, DM management, primary care issues.\par \par Patient was given the opportunity to ask questions. Her questions have been answered to her apparent satisfaction at this time. She expressed her understanding and willingness to comply with recommended f/u.

## 2022-09-07 NOTE — HISTORY OF PRESENT ILLNESS
[Disease: _____________________] : Disease: [unfilled] [T: ___] : T[unfilled] [N: ___] : N[unfilled] [AJCC Stage: ____] : AJCC Stage: [unfilled] [de-identified] : Patient presenting at the request of her breast surgeon for oncology consultation for left breast DCIS.\par \par 5/18/22 B/L mammogram-showed left upper outer quadrant middle third with new grouping of calcifications. Spot magnification recommended. US 5/18/22 was negative. Diagnostic mammogram on 5/24/2022 showed left upper outer quadrant with small grouping of approximately 8 calcifications, biopsy recommended.\par \par 5/31/2022-She underwent biopsy of the left breast UOQ. Pathology showed DCIS, cribriform pattern, intermediate grade. ER (95%, strong), IN 85% (85%, strong).\par \par 6/15/2022-MRI breasts demonstrates bilateral scattered enhancing nonspecific foci. L UOQ middle depth with a focus of susceptibility artifact compatible with biopsy marker with no significant suspicious associated enhancement. \par \par 7/7/2022-She underwent left lumpectomy and SLNB. Pathology shows DCIS, cribriform and solid pattern with low to intermediate nuclear atypia, negative resection margins. Left inferior margin with focal DCIS, cribriform type with low nuclear grade measuring approx. 2 mm in greatest dimension and extends to 2 mm from inked margin. Negative LN's (0/2).\par Other margins negative. L SLNB: negative (0/2)--[id number] \par \par 8/9/2022-DCISionRT score 0.8 (low risk). Saw Dr. Cheryl Rausch in radiation oncology consultation, and radiation decided against.\par \par  [de-identified] : DCIS [de-identified] : ER+/VT+ [de-identified] : 6/13/22-[facility] 9 gene panel negative (including BRCA1 and 2). [de-identified] : Saw RT MD and decided against radiation due to low DCISion RT score.\par No pulmonary/GI/ complaints.  No fevers.  No abnormal bruising/bleeding reported.\par \par Has had COVID vaccines (Moderna) x2, and 1 booster.

## 2022-09-07 NOTE — PHYSICAL EXAM
[Fully active, able to carry on all pre-disease performance without restriction] : Status 0 - Fully active, able to carry on all pre-disease performance without restriction [Normal] : affect appropriate [de-identified] : no dominant mass, nipple retraction or discharge

## 2022-09-13 ENCOUNTER — APPOINTMENT (OUTPATIENT)
Dept: RADIOLOGY | Facility: IMAGING CENTER | Age: 57
End: 2022-09-13

## 2022-09-13 ENCOUNTER — OUTPATIENT (OUTPATIENT)
Dept: OUTPATIENT SERVICES | Facility: HOSPITAL | Age: 57
LOS: 1 days | End: 2022-09-13
Payer: COMMERCIAL

## 2022-09-13 DIAGNOSIS — Z98.891 HISTORY OF UTERINE SCAR FROM PREVIOUS SURGERY: Chronic | ICD-10-CM

## 2022-09-13 DIAGNOSIS — Z00.8 ENCOUNTER FOR OTHER GENERAL EXAMINATION: ICD-10-CM

## 2022-09-13 DIAGNOSIS — Z98.890 OTHER SPECIFIED POSTPROCEDURAL STATES: Chronic | ICD-10-CM

## 2022-09-13 PROCEDURE — 77080 DXA BONE DENSITY AXIAL: CPT

## 2022-09-13 PROCEDURE — 77080 DXA BONE DENSITY AXIAL: CPT | Mod: 26

## 2022-09-16 ENCOUNTER — NON-APPOINTMENT (OUTPATIENT)
Age: 57
End: 2022-09-16

## 2022-09-16 DIAGNOSIS — Z92.89 PERSONAL HISTORY OF OTHER MEDICAL TREATMENT: ICD-10-CM

## 2022-10-19 ENCOUNTER — APPOINTMENT (OUTPATIENT)
Dept: SURGICAL ONCOLOGY | Facility: CLINIC | Age: 57
End: 2022-10-19

## 2022-10-19 VITALS
RESPIRATION RATE: 16 BRPM | TEMPERATURE: 97.1 F | DIASTOLIC BLOOD PRESSURE: 85 MMHG | WEIGHT: 199 LBS | BODY MASS INDEX: 37.57 KG/M2 | HEIGHT: 61 IN | OXYGEN SATURATION: 98 % | HEART RATE: 70 BPM | SYSTOLIC BLOOD PRESSURE: 147 MMHG

## 2022-10-19 PROCEDURE — 99213 OFFICE O/P EST LOW 20 MIN: CPT

## 2022-10-19 NOTE — PHYSICAL EXAM
[Normocephalic] : normocephalic [Atraumatic] : atraumatic [EOMI] : extra ocular movement intact [PERRL] : pupils equal, round and reactive to light [Sclera nonicteric] : sclera nonicteric [Supple] : supple [No Supraclavicular Adenopathy] : no supraclavicular adenopathy [No Cervical Adenopathy] : no cervical adenopathy [No Thyromegaly] : no thyromegaly [Examined in the supine and seated position] : examined in the supine and seated position [Bra Size: ___] : Bra Size: [unfilled] [Grade 2] : Ptosis Grade 2 [Breast Mass Right Breast ___cm] : no masses [Breast Mass Left Breast ___cm] : no masses [Breast Nipple Inversion] : nipples not inverted [Breast Nipple Retraction] : nipples not retracted [Breast Nipple Flattening] : nipples not flattened [Breast Nipple Fissures] : nipples not fissured [No Axillary Lymphadenopathy] : no left axillary lymphadenopathy [No Edema] : no edema [No Rashes] : no rashes [No Ulceration] : no ulceration [de-identified] : non-labored respirations  [de-identified] : two parallel transverse keloids/scars medial and lateral to the NAC, stable\par superior circumareolar incision well-healed, retracted, no erythema, no fluctuance [de-identified] : left incision well-healed

## 2022-10-19 NOTE — ASSESSMENT
[FreeTextEntry1] : The patient is a 57 year F referred by Dr. 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 2 mm in greatest dimension and extends to  2 mm from inked margin\par - L superior, medial, lateral, deep and anterior margin benign\par -  ER 95%, VT 85%, strong\par - AJCC [id number], Stage 0\par \par 7/20/2022: Exam showed no evidence of infection in either incision. Seroma noted in axilla, attempt to drain with needle unsuccessful. Two attempts made with approximately 5 cc of bloody fluid returned only. Pressure held and bandaid placed on area.\par \par Exam today - well-healed, no infection, seroma, or hematoma. NO masses\par \par Plan:\par  - F/up medical oncology, cont anastrazole\par  - Next SM/US 5/2023\par  - RTO after mmg and US\par

## 2022-10-19 NOTE — HISTORY OF PRESENT ILLNESS
[FreeTextEntry1] : The patient is a 57 year F referred by Dr. Magan Borges for consultation regarding left DCIS s/p L breast lumpectomy and L SLNB on 7/7/2022 here for follow up visit.\par \par Prior History:\par Patient does not have a personal history of breast cancer. Benign left cyst aspiration in 2018. She reports that she has otherwise had normal breast imaging previously, no prior biopsies. \par \par Recent imaging:\par 5/18/2022 B/L SM, TC 11.6% (NW) revealed SFGD\par - L UOQ middle third shows new grouping of calcifications-> rec spot magnification \par - R negative \par - BR0\par \par 5/18/2022 B/L US - negative\par \par 5/24/2022 L DM (NW)\par - L UOQ middle depth calcifications consist of a small grouping of approximately 8 calcifications-> f/u stereo bx\par -BR4A\par \par 5/31/2022 L UOQ Stereo bx\par -L UOQ (top hat clip)- DCIS, cribriform pattern w/ intermediate grade nuclear atypia, microcalcs present in DCIS.\par - ER 95%, AR 85%, strong\par \par Patient has a past medical history of DM2, obesity, plantar fasciitis, HTN and HLD. Past surgical history include C section and laparoscopy for infertility.\par \par Patient has a family history of DM2 (mother) and CAD (mother, brother). Reports family history of breast cancer in 3 maternal cousins, age 30s, 30-40s, 60s. Her mother also had some kind of cancer, unknown.\par \par 6/8/2022 [facility] GT for 9 gene panel: negative\par \par 6/15/2022 MRI (NW)\par - B/L scattered enhancing nonspecific foci\par - L UOQ middle depth: a focus of susceptibility artifact compatible with biopsy marker with no significant suspicious associated enhancement. Minimal post biopsy change is seen along the biopsy tract extending superior from the marker placed during stereotactic guided biopsy demonstrating DCIS.\par - BR6\par \par 7/7/2022 L lumpectomy and L SLNB pathology\par - L breast DCIS, cribriform and solid pattern with low to intermediate nuclear atypia, negative resection margins\par - L inferior margin focal DCIS, cribriform type with low nuclear grade measuring approx. 2 mm in greatest dimension and extends to  2 mm from inked margin\par - Other margins negative\par - L SLNB: negative (0/2)\par - [id number] \par \par 7/20/2022 Post-op visit:\par She notes that she had less pain initially after surgery, but then started to notice more discomfort from her underarm area this past week. Denies redness to the area, denies fevers. Thinks she had some chills over the weekend.\par \par 9/13/2022 DEXA WNL\par \par Interval History: She met with rad onc and checked DCISionRT which was low risk and favorable. Patient started Anastrazole--started over 1 month ago. Has side effects: difficulty sleeping, diarrhea, achy, hot flashes. Still feels discomfort in axilla, but tolerable. Discomfort is only occasional.

## 2022-10-19 NOTE — CONSULT LETTER
[Dear  ___] : Dear  [unfilled], [Courtesy Letter:] : I had the pleasure of seeing your patient, [unfilled], in my office today. [Please see my note below.] : Please see my note below. [Consult Closing:] : Thank you very much for allowing me to participate in the care of this patient.  If you have any questions, please do not hesitate to contact me. [Sincerely,] : Sincerely, [FreeTextEntry3] : Carissa Scott MD\par Breast Surgeon\par Division of Surgical Oncology\par Department of Surgery\par [address] [place] [address]\[place] [address] \par Tel: [phone]\par Fax: [phone]\par Email: [email or web address]\par

## 2022-12-15 ENCOUNTER — OUTPATIENT (OUTPATIENT)
Dept: OUTPATIENT SERVICES | Facility: HOSPITAL | Age: 57
LOS: 1 days | Discharge: HOME | End: 2022-12-15

## 2022-12-15 DIAGNOSIS — Z98.891 HISTORY OF UTERINE SCAR FROM PREVIOUS SURGERY: Chronic | ICD-10-CM

## 2022-12-15 DIAGNOSIS — C50.919 MALIGNANT NEOPLASM OF UNSPECIFIED SITE OF UNSPECIFIED FEMALE BREAST: ICD-10-CM

## 2022-12-15 DIAGNOSIS — Z98.890 OTHER SPECIFIED POSTPROCEDURAL STATES: Chronic | ICD-10-CM

## 2022-12-21 ENCOUNTER — APPOINTMENT (OUTPATIENT)
Dept: HEMATOLOGY ONCOLOGY | Facility: CLINIC | Age: 57
End: 2022-12-21

## 2022-12-21 VITALS
TEMPERATURE: 97.2 F | RESPIRATION RATE: 16 BRPM | HEART RATE: 62 BPM | WEIGHT: 191.8 LBS | DIASTOLIC BLOOD PRESSURE: 80 MMHG | SYSTOLIC BLOOD PRESSURE: 132 MMHG | OXYGEN SATURATION: 99 % | BODY MASS INDEX: 36.24 KG/M2

## 2022-12-21 PROCEDURE — 99214 OFFICE O/P EST MOD 30 MIN: CPT

## 2022-12-21 NOTE — HISTORY OF PRESENT ILLNESS
[Disease: _____________________] : Disease: [unfilled] [T: ___] : T[unfilled] [N: ___] : N[unfilled] [AJCC Stage: ____] : AJCC Stage: [unfilled] [de-identified] : Patient presenting at the request of her breast surgeon for oncology consultation for left breast DCIS.\par \par 5/18/22 B/L mammogram-showed left upper outer quadrant middle third with new grouping of calcifications. Spot magnification recommended. US 5/18/22 was negative. Diagnostic mammogram on 5/24/2022 showed left upper outer quadrant with small grouping of approximately 8 calcifications, biopsy recommended.\par \par 5/31/2022-She underwent biopsy of the left breast UOQ. Pathology showed DCIS, cribriform pattern, intermediate grade. ER (95%, strong), KS 85% (85%, strong).\par \par 6/15/2022-MRI breasts demonstrates bilateral scattered enhancing nonspecific foci. L UOQ middle depth with a focus of susceptibility artifact compatible with biopsy marker with no significant suspicious associated enhancement. \par \par 7/7/2022-She underwent left lumpectomy and SLNB. Pathology shows DCIS, cribriform and solid pattern with low to intermediate nuclear atypia, negative resection margins. Left inferior margin with focal DCIS, cribriform type with low nuclear grade measuring approx. 2 mm in greatest dimension and extends to 2 mm from inked margin. Negative LN's (0/2).\par Other margins negative. L SLNB: negative (0/2)--[id number] \par \par 8/9/2022-DCISionRT score 0.8 (low risk). Saw Dr. Cheryl Rausch in radiation oncology consultation, and radiation decided against.\par \par 9/2022-Began Anastrozole. [de-identified] : ER+/ID+ [de-identified] : DCIS [de-identified] : 6/13/22-Inv[facility]ae 9 gene panel negative (including BRCA1 and 2). [de-identified] : No pulmonary/ complaints.  No fevers.  No abnormal bruising/bleeding reported.\par +Loose consistency to stool patient temporally relates to anastrozole. No associated abdominal pain, fevers, N/V.\par \par Has had COVID vaccines (Moderna) x2, and 1 booster.

## 2022-12-21 NOTE — PHYSICAL EXAM
[Fully active, able to carry on all pre-disease performance without restriction] : Status 0 - Fully active, able to carry on all pre-disease performance without restriction [Normal] : affect appropriate [de-identified] : no dominant mass, nipple retraction or discharge

## 2022-12-21 NOTE — CONSULT LETTER
[Dear  ___] : Dear  [unfilled], [Courtesy Letter:] : I had the pleasure of seeing your patient, [unfilled], in my office today. [Please see my note below.] : Please see my note below. [Consult Closing:] : Thank you very much for allowing me to participate in the care of this patient.  If you have any questions, please do not hesitate to contact me. [Sincerely,] : Sincerely, [FreeTextEntry3] : oDt Castellanos MD

## 2022-12-21 NOTE — ASSESSMENT
[FreeTextEntry1] : Lab work reviewed.\par 7/7/2022-Left breast DCIS ER+/IA+, s/p left BCS/SLND.\par Patient has opted for BCS. \par 8/9/2022-DCISionRT score 0.8 (low risk)-saw Dr. Rausch (radiation oncology)-decided against RT.\par With ER+/IA+ disease, began anastrozole 9/2022.  With loose stool, reviewed bowel regimen.  If increased frequency of bowel movements, can consider alternative endocrine therapy as patient temporally relates the symptom to beginning anastrozole.  Have also advised that she see gastroenterology for evaluation and for her screening colonoscopy.  Discussed alternative endocrine therapy options with respective pros/cons.  Patient wishes to continue anastrozole at this time, however, will call if increased symptoms and she wishes to change the medication.\par [id number].\par \par To f/u with PCP for HTN, DM management, primary care issues.\par \par Patient was given the opportunity to ask questions. Her questions have been answered to her apparent satisfaction at this time. She expressed her understanding and willingness to comply with recommended f/u.

## 2023-01-30 ENCOUNTER — APPOINTMENT (OUTPATIENT)
Dept: ORTHOPEDIC SURGERY | Facility: CLINIC | Age: 58
End: 2023-01-30
Payer: COMMERCIAL

## 2023-01-30 VITALS — WEIGHT: 191 LBS | HEIGHT: 61 IN | BODY MASS INDEX: 36.06 KG/M2

## 2023-01-30 DIAGNOSIS — M47.27 OTHER SPONDYLOSIS WITH RADICULOPATHY, LUMBOSACRAL REGION: ICD-10-CM

## 2023-01-30 PROCEDURE — 72100 X-RAY EXAM L-S SPINE 2/3 VWS: CPT

## 2023-01-30 PROCEDURE — 72170 X-RAY EXAM OF PELVIS: CPT

## 2023-01-30 NOTE — DISCUSSION/SUMMARY
[de-identified] : plan for PT for now. will monitor numbness of thigh. it is currently improving. discussed gabapentin. \par \par The patient was advised of the diagnosis. The natural history of the pathology was explained in full. All questions were answered. The risks and benefits of conservative and interventional treatment alternatives were explained to the patient.\par

## 2023-01-30 NOTE — HISTORY OF PRESENT ILLNESS
[Lower back] : lower back [Left Leg] : left leg [6] : 6 [3] : 3 [Radiating] : radiating [Constant] : constant [Full time] : Work status: full time [de-identified] : 58 yo F here for eval of low back pain with radiation into Left anterior thigh, for 3 weeks. No injury. Has associated N/T. Taking muscle relaxer/ naproxen per uurgent care. Has had some relief but N/T persists which is worrying her. No radicular sx on R side. No changes in B/B.  Did not do any PT.  [] : no [FreeTextEntry7] : L leg [de-identified] : none [de-identified] : none

## 2023-01-30 NOTE — IMAGING
[de-identified] : \par Spine:\par Inspection/Palpation:\par No tenderness to palpation throughout Cervical/thoracic/lumbar spine except ttp over Left lumbar paraspinal and L si joint. L GTB ttp\par No bony stepoffs, No lesions.\par \par Gait:\par non-antalgic, able to perform bilateral toe and heel rise. Able to perform tandem gait. \par \par Neurologic:\par Bilateral Lower Extremities 5/5 Iliopsoas/Quadriceps/Hamstrings/ Tibialis Anterior/ Gastrocnemius. Extensor Hallucis Longus/ Flexor Hallucis Longus except \par \par (-) SLR Bilaterally\par \par Sensation intact to light touch L2-S1\par \par Patellar/ Achilles reflex within normal limits.\par  [Facet arthropathy] : Facet arthropathy [There are no fractures, subluxations or dislocations. No significant abnormalities are seen] : There are no fractures, subluxations or dislocations. No significant abnormalities are seen

## 2023-03-06 ENCOUNTER — APPOINTMENT (OUTPATIENT)
Dept: ORTHOPEDIC SURGERY | Facility: CLINIC | Age: 58
End: 2023-03-06

## 2023-03-14 ENCOUNTER — RX RENEWAL (OUTPATIENT)
Age: 58
End: 2023-03-14

## 2023-04-07 ENCOUNTER — APPOINTMENT (OUTPATIENT)
Dept: ORTHOPEDIC SURGERY | Facility: CLINIC | Age: 58
End: 2023-04-07

## 2023-04-25 NOTE — REASON FOR VISIT
Spoke with Mikala Consultation] : a consultation visit

## 2023-05-03 ENCOUNTER — APPOINTMENT (OUTPATIENT)
Dept: ORTHOPEDIC SURGERY | Facility: CLINIC | Age: 58
End: 2023-05-03
Payer: COMMERCIAL

## 2023-05-03 VITALS — HEIGHT: 61 IN | WEIGHT: 191 LBS | BODY MASS INDEX: 36.06 KG/M2

## 2023-05-03 DIAGNOSIS — M54.16 RADICULOPATHY, LUMBAR REGION: ICD-10-CM

## 2023-05-03 PROCEDURE — 99214 OFFICE O/P EST MOD 30 MIN: CPT

## 2023-05-03 PROCEDURE — 99204 OFFICE O/P NEW MOD 45 MIN: CPT

## 2023-05-03 NOTE — IMAGING
[de-identified] : LSPINE\par ROM: Full with no pain\par Strength: 5/5 bilateral hip flexors, knee extensors, ankle dorsiflexors, EHL, ankle plantarflexors\par Sensation: Sensation present to light touch bilateral L2-S1 distributions\par Provocative maneuvers: Negative bilateral straight leg raise \par \par Bilateral hips-\par Palpation: No tenderness to palpation over greater trochanter or IT band\par ROM: No pain with flexion and internal rotation  [Disc space narrowing] : Disc space narrowing [AP] : anteroposterior [There are no fractures, subluxations or dislocations. No significant abnormalities are seen] : There are no fractures, subluxations or dislocations. No significant abnormalities are seen

## 2023-05-03 NOTE — HISTORY OF PRESENT ILLNESS
[Lower back] : lower back [Left Leg] : left leg [0] : 0 [Occasional] : occasional [Full time] : Work status: full time [de-identified] : Pt seen by non-spine partners in the past \par \par 5/3/23- 58 yo F here for eval of low back pain with radiation into Left anterior thigh, started in Dec. 2022. No injury. Has associated N/T. Taking muscle relaxer/ naproxen per urgent care. Has had some relief but N/T persists which is worrying her. No radicular sx on R side. No changes in B/B. Has been going to PT. Treated by my partner.  [] : no [FreeTextEntry7] : L leg [de-identified] : none [de-identified] : none

## 2023-05-03 NOTE — ASSESSMENT
[FreeTextEntry1] : C/w PT, meds\par Will discuss imaging\par \par NSAIDs- Patient warned of risk of medication to GI tract, increased blood pressure, cardiac risk, and risk of fluid retention.  Advised to clear medication with internist or PCP if any concurrent health problem with heart, blood pressure, or GI system exists.\par

## 2023-05-24 ENCOUNTER — APPOINTMENT (OUTPATIENT)
Dept: SURGICAL ONCOLOGY | Facility: CLINIC | Age: 58
End: 2023-05-24

## 2023-05-30 ENCOUNTER — OUTPATIENT (OUTPATIENT)
Dept: OUTPATIENT SERVICES | Facility: HOSPITAL | Age: 58
LOS: 1 days | Discharge: HOME | End: 2023-05-30

## 2023-05-30 DIAGNOSIS — Z98.890 OTHER SPECIFIED POSTPROCEDURAL STATES: Chronic | ICD-10-CM

## 2023-05-30 DIAGNOSIS — Z98.891 HISTORY OF UTERINE SCAR FROM PREVIOUS SURGERY: Chronic | ICD-10-CM

## 2023-05-30 DIAGNOSIS — C50.919 MALIGNANT NEOPLASM OF UNSPECIFIED SITE OF UNSPECIFIED FEMALE BREAST: ICD-10-CM

## 2023-06-12 ENCOUNTER — APPOINTMENT (OUTPATIENT)
Dept: HEMATOLOGY ONCOLOGY | Facility: CLINIC | Age: 58
End: 2023-06-12
Payer: COMMERCIAL

## 2023-06-12 VITALS
TEMPERATURE: 97.2 F | BODY MASS INDEX: 36.8 KG/M2 | SYSTOLIC BLOOD PRESSURE: 133 MMHG | RESPIRATION RATE: 16 BRPM | DIASTOLIC BLOOD PRESSURE: 85 MMHG | HEART RATE: 64 BPM | HEIGHT: 60.98 IN | OXYGEN SATURATION: 99 % | WEIGHT: 194.89 LBS

## 2023-06-12 PROCEDURE — 99214 OFFICE O/P EST MOD 30 MIN: CPT

## 2023-06-12 NOTE — HISTORY OF PRESENT ILLNESS
[Disease: _____________________] : Disease: [unfilled] [T: ___] : T[unfilled] [N: ___] : N[unfilled] [AJCC Stage: ____] : AJCC Stage: [unfilled] [de-identified] : 5/18/22 B/L mammogram-showed left upper outer quadrant middle third with new grouping of calcifications. Spot magnification recommended. US 5/18/22 was negative. Diagnostic mammogram on 5/24/2022 showed left upper outer quadrant with small grouping of approximately 8 calcifications, biopsy recommended.\par \par 5/31/2022-She underwent biopsy of the left breast UOQ. Pathology showed DCIS, cribriform pattern, intermediate grade. ER (95%, strong), [place] 85% (85%, strong).\par \par 6/15/2022-MRI breasts demonstrates bilateral scattered enhancing nonspecific foci. L UOQ middle depth with a focus of susceptibility artifact compatible with biopsy marker with no significant suspicious associated enhancement. \par \par 7/7/2022-She underwent left lumpectomy and SLNB. Pathology shows DCIS, cribriform and solid pattern with low to intermediate nuclear atypia, negative resection margins. Left inferior margin with focal DCIS, cribriform type with low nuclear grade measuring approx. 2 mm in greatest dimension and extends to 2 mm from inked margin. Negative LN's (0/2).\par Other margins negative. L SLNB: negative (0/2)--[id number] \par \par 8/9/2022-DCISionRT score 0.8 (low risk). Saw Dr. Cheryl Rausch in radiation oncology consultation, and radiation decided against.\par \par 9/2022-Began Anastrozole. [de-identified] : DCIS [de-identified] : ER+/MD+ [de-identified] : 6/13/22-Inv[facility]ae 9 gene panel negative (including BRCA1 and 2). [de-identified] : Persistent loose stools.  No associated abdominal pain, fevers, N/V. Has not seen GI MD yet.\par No pulmonary/ complaints.  No fevers.  No abnormal bruising/bleeding reported.\par \par

## 2023-06-12 NOTE — ASSESSMENT
[FreeTextEntry1] : #1) 7/7/2022-Left breast DCIS ER+/HI+, s/p left BCS/SLND.\par 8/9/2022-DCISionRT score 0.8 (low risk)-saw Dr. Rausch (radiation oncology)-decided against RT.\par With ER+/HI+ disease, began anastrozole 9/2022.  \par [id number].\par Next breast imaging to be done-ordered by breast surgeon.\par \par #2) To f/u with PCP for HTN, DM management, primary care issues.\par \par #3) advised updating screening colonoscopy.\par \par Patient was given the opportunity to ask questions. Her questions have been answered to her apparent satisfaction at this time. She expressed her understanding and willingness to comply with recommended f/u.

## 2023-06-12 NOTE — PHYSICAL EXAM
[Fully active, able to carry on all pre-disease performance without restriction] : Status 0 - Fully active, able to carry on all pre-disease performance without restriction [Normal] : affect appropriate [de-identified] : no dominant mass, nipple retraction or discharge

## 2023-06-12 NOTE — CONSULT LETTER
[Dear  ___] : Dear  [unfilled], [Courtesy Letter:] : I had the pleasure of seeing your patient, [unfilled], in my office today. [Please see my note below.] : Please see my note below. [Consult Closing:] : Thank you very much for allowing me to participate in the care of this patient.  If you have any questions, please do not hesitate to contact me. [Sincerely,] : Sincerely, [FreeTextEntry3] : Dot Castellanos MD

## 2023-06-30 ENCOUNTER — OUTPATIENT (OUTPATIENT)
Dept: OUTPATIENT SERVICES | Facility: HOSPITAL | Age: 58
LOS: 1 days | End: 2023-06-30
Payer: COMMERCIAL

## 2023-06-30 ENCOUNTER — RESULT REVIEW (OUTPATIENT)
Age: 58
End: 2023-06-30

## 2023-06-30 ENCOUNTER — APPOINTMENT (OUTPATIENT)
Dept: MAMMOGRAPHY | Facility: CLINIC | Age: 58
End: 2023-06-30
Payer: COMMERCIAL

## 2023-06-30 ENCOUNTER — APPOINTMENT (OUTPATIENT)
Dept: ULTRASOUND IMAGING | Facility: CLINIC | Age: 58
End: 2023-06-30
Payer: COMMERCIAL

## 2023-06-30 DIAGNOSIS — Z98.891 HISTORY OF UTERINE SCAR FROM PREVIOUS SURGERY: Chronic | ICD-10-CM

## 2023-06-30 DIAGNOSIS — D05.12 INTRADUCTAL CARCINOMA IN SITU OF LEFT BREAST: ICD-10-CM

## 2023-06-30 DIAGNOSIS — Z98.890 OTHER SPECIFIED POSTPROCEDURAL STATES: Chronic | ICD-10-CM

## 2023-06-30 DIAGNOSIS — Z00.00 ENCOUNTER FOR GENERAL ADULT MEDICAL EXAMINATION WITHOUT ABNORMAL FINDINGS: ICD-10-CM

## 2023-06-30 DIAGNOSIS — Z00.8 ENCOUNTER FOR OTHER GENERAL EXAMINATION: ICD-10-CM

## 2023-06-30 PROCEDURE — 77062 BREAST TOMOSYNTHESIS BI: CPT | Mod: 26

## 2023-06-30 PROCEDURE — 77066 DX MAMMO INCL CAD BI: CPT | Mod: 26

## 2023-06-30 PROCEDURE — 76641 ULTRASOUND BREAST COMPLETE: CPT

## 2023-06-30 PROCEDURE — 76641 ULTRASOUND BREAST COMPLETE: CPT | Mod: 26,50

## 2023-06-30 PROCEDURE — 77066 DX MAMMO INCL CAD BI: CPT

## 2023-06-30 PROCEDURE — G0279: CPT

## 2023-07-10 ENCOUNTER — APPOINTMENT (OUTPATIENT)
Dept: SURGICAL ONCOLOGY | Facility: CLINIC | Age: 58
End: 2023-07-10
Payer: COMMERCIAL

## 2023-07-10 ENCOUNTER — APPOINTMENT (OUTPATIENT)
Dept: ORTHOPEDIC SURGERY | Facility: CLINIC | Age: 58
End: 2023-07-10

## 2023-07-10 VITALS
DIASTOLIC BLOOD PRESSURE: 82 MMHG | BODY MASS INDEX: 37.69 KG/M2 | RESPIRATION RATE: 16 BRPM | SYSTOLIC BLOOD PRESSURE: 134 MMHG | HEART RATE: 69 BPM | WEIGHT: 192 LBS | HEIGHT: 60 IN | OXYGEN SATURATION: 98 %

## 2023-07-10 PROCEDURE — 99213 OFFICE O/P EST LOW 20 MIN: CPT

## 2023-07-10 NOTE — REVIEW OF SYSTEMS
[Diarrhea] : diarrhea [Arthralgias] : arthralgias [As Noted in HPI] : as noted in HPI [Hot Flashes] : hot flashes [Negative] : Heme/Lymph

## 2023-07-10 NOTE — CONSULT LETTER
[Dear  ___] : Dear  [unfilled], [Courtesy Letter:] : I had the pleasure of seeing your patient, [unfilled], in my office today. [Please see my note below.] : Please see my note below. [Consult Closing:] : Thank you very much for allowing me to participate in the care of this patient.  If you have any questions, please do not hesitate to contact me. [Sincerely,] : Sincerely, [FreeTextEntry3] : Carissa Scott MD\par Breast Surgeon\par Division of Surgical Oncology\par Department of Surgery\par [address] [place] [address]\[place] [address] \par Tel: [phone]\par Fax: [phone]\par Email: [email or web address]\par

## 2023-07-10 NOTE — HISTORY OF PRESENT ILLNESS
[FreeTextEntry1] : The patient is a 58 year F referred by Dr. Magan Borges for consultation regarding left DCIS s/p L breast lumpectomy and L SLNB on 7/7/2022 here for follow up visit.\par \par Prior History:\par Benign left cyst aspiration in 2018. She reports that she has otherwise had normal breast imaging previously, no prior biopsies. \par \par Recent imaging:\par 5/18/2022 B/L SM, TC 11.6% (NW) revealed SFGD\par - L UOQ middle third shows new grouping of calcifications-> rec spot magnification \par - R negative \par - BR0\par \par 5/18/2022 B/L US - negative\par \par 5/24/2022 L DM (NW)\par - L UOQ middle depth calcifications consist of a small grouping of approximately 8 calcifications-> f/u stereo bx\par -BR4A\par \par 5/31/2022 L UOQ Stereo bx\par -L UOQ (top hat clip)- DCIS, cribriform pattern w/ intermediate grade nuclear atypia, microcalcs present in DCIS.\par - ER 95%, MD 85%, strong\par \par Patient has a past medical history of DM2, obesity, plantar fasciitis, HTN and HLD. Past surgical history include C section and laparoscopy for infertility.\par \par Patient has a family history of DM2 (mother) and CAD (mother, brother). Reports family history of breast cancer in 3 maternal cousins, age 30s, 30-40s, 60s. Her mother also had some kind of cancer, unknown.\par \par 6/8/2022 [facility] GT for 9 gene panel: negative\par \par 6/15/2022 MRI (NW)\par - B/L scattered enhancing nonspecific foci\par - L UOQ middle depth: a focus of susceptibility artifact compatible with biopsy marker with no significant suspicious associated enhancement. Minimal post biopsy change is seen along the biopsy tract extending superior from the marker placed during stereotactic guided biopsy demonstrating DCIS.\par - BR6\par \par 7/7/2022 L lumpectomy and L SLNB pathology (second opinion [facility] saw questionable microinvasive duct carcinoma)\par - L breast DCIS, cribriform and solid pattern with low to intermediate nuclear atypia, negative resection margins\par - L inferior margin focal DCIS, cribriform type with low nuclear grade measuring approx. 2 mm in greatest dimension and extends to  2 mm from inked margin\par - Other margins negative\par - L SLNB: negative (0/2)\par - [id number] \par \par 7/20/2022 Post-op visit:\par She notes that she had less pain initially after surgery, but then started to notice more discomfort from her underarm area this past week. Denies redness to the area, denies fevers. Thinks she had some chills over the weekend.\par \par 9/13/2022 DEXA WNL\par \par 10/19/2022: She met with rad onc and checked DCISionRT which was low risk and favorable. Patient started Anastrazole--started over 1 month ago. Has side effects: difficulty sleeping, diarrhea, achy, hot flashes. Still feels discomfort in axilla, but tolerable. Discomfort is only occasional. \par \par 6/30/2023 B/L DM (NW) revealed SFGD breasts\par - L upper central: new postlumpectomy changes underlying scar site\par - L axillary tail: new postsurgical changes underlying scar site\par - R upper posterior: asymmetry demonstrates long term stability dating back to 2020, favoring benign etiology\par \par 6/30/2023 B/L US \par - R neg\par - L 12:00 N1: scar site\par - BR2, mmg in 1 year\par \par Interval History:\par Pt still notes discomfort under her left arm--also uncomfortable with excess skin on underside of arm. Denies swelling of arm. Has had worsening of joint pains on anastrazole, mostly at night has aches. Worst with thumbs. Having hot flashes, difficulty sleeping, thinks loose stools are associated with anastrazole. No new breast complaints.

## 2023-07-10 NOTE — ASSESSMENT
[FreeTextEntry1] : The patient is a 58 year F referred by Dr. Magan Borges for consultation regarding left DCIS, ER 95%, MT 85%, strong s/p L breast lumpectomy and L SLNB on 7/7/2022.\par \par 7/7/2022 L lumpectomy and L SLNB pathology\par - L SLNB: negative (0/2)\par - L breast DCIS, cribriform and solid pattern with low to intermediate nuclear atypia, negative resection margins\par - L inferior margin focal DCIS, cribriform type with low nuclear grade measuring approx. 2 mm in greatest dimension and extends to  2 mm from inked margin\par - L superior, medial, lateral, deep and anterior margin benign\par -  ER 95%, MT 85%, strong\par - AJCC [id number], Stage 0\par \par 7/20/2022: Exam showed no evidence of infection in either incision. Seroma noted in axilla, attempt to drain with needle unsuccessful. Two attempts made with approximately 5 cc of bloody fluid returned only. Pressure held and bandaid placed on area.\par \par 10/19/2022: Exam today - well-healed, no infection, seroma, or hematoma. NO masses\par \par Recent imaging revealed new postlumpectomy and postsurgical changes, BR2.\par \par Exam today stable. No masses or lymphadenopathy\par \par Plan:\par  - F/up medical oncology, cont anastrazole for now, consider switch to other AIs given side effects\par  - Next /US 6/2024\par  - RTO 6 months\par

## 2023-07-10 NOTE — PHYSICAL EXAM
[Normocephalic] : normocephalic [Atraumatic] : atraumatic [EOMI] : extra ocular movement intact [PERRL] : pupils equal, round and reactive to light [Sclera nonicteric] : sclera nonicteric [Supple] : supple [No Supraclavicular Adenopathy] : no supraclavicular adenopathy [No Cervical Adenopathy] : no cervical adenopathy [No Thyromegaly] : no thyromegaly [Examined in the supine and seated position] : examined in the supine and seated position [Bra Size: ___] : Bra Size: [unfilled] [Grade 2] : Ptosis Grade 2 [Breast Mass Right Breast ___cm] : no masses [Breast Mass Left Breast ___cm] : no masses [No Axillary Lymphadenopathy] : no left axillary lymphadenopathy [No Edema] : no edema [No Rashes] : no rashes [No Ulceration] : no ulceration [Breast Nipple Inversion] : nipples not inverted [Breast Nipple Retraction] : nipples not retracted [Breast Nipple Flattening] : nipples not flattened [Breast Nipple Fissures] : nipples not fissured [de-identified] : non-labored respirations  [de-identified] : two parallel transverse keloids/scars medial and lateral to the NAC, stable\par superior circumareolar incision well-healed, retracted, no erythema, no fluctuance [de-identified] : left incision well-healed

## 2023-07-19 ENCOUNTER — APPOINTMENT (OUTPATIENT)
Dept: ORTHOPEDIC SURGERY | Facility: CLINIC | Age: 58
End: 2023-07-19
Payer: COMMERCIAL

## 2023-07-19 VITALS — BODY MASS INDEX: 37.69 KG/M2 | WEIGHT: 192 LBS | HEIGHT: 60 IN

## 2023-07-19 DIAGNOSIS — M65.311 TRIGGER THUMB, RIGHT THUMB: ICD-10-CM

## 2023-07-19 DIAGNOSIS — M65.312 TRIGGER THUMB, LEFT THUMB: ICD-10-CM

## 2023-07-19 PROCEDURE — 20550 NJX 1 TENDON SHEATH/LIGAMENT: CPT | Mod: 50

## 2023-07-19 PROCEDURE — 73140 X-RAY EXAM OF FINGER(S): CPT | Mod: 50

## 2023-07-19 PROCEDURE — 99214 OFFICE O/P EST MOD 30 MIN: CPT | Mod: 25

## 2023-07-19 NOTE — CONSULT LETTER
[Dear  ___] : Dear  [unfilled], [Consult Letter:] : I had the pleasure of evaluating your patient, [unfilled]. [Please see my note below.] : Please see my note below. [Consult Closing:] : Thank you very much for allowing me to participate in the care of this patient.  If you have any questions, please do not hesitate to contact me. [Sincerely,] : Sincerely, [FreeTextEntry3] : Angelica Mendez MD

## 2023-07-19 NOTE — IMAGING
[de-identified] : LEFT HAND\par skin intact. no swelling.\par TTP to thumb A1 pulley.\par wrist ROM: good extension, flexion. good pronation, supination.\par good EPL, very limited FPL. good finger extension, flex to full fist. good finger abduction and adduction. \par SILT median, ulnar, radial distributions.\par palpable radial pulse, brisk cap refill all digits.\par no triggering.\par \par \par RIGHT HAND\par skin intact. mild swelling of thumb.\par TTP to thumb A1 pulley.\par wrist ROM: good extension, flexion. good pronation, supination.\par good EPL, mild limited FPL. good finger extension, flex to full fist. good finger abduction and adduction. \par SILT median, ulnar, radial distributions.\par palpable radial pulse, brisk cap refill all digits.\par + triggering of thumb.\par \par \par XRAYS OF LEFT THUMB: no acute displaced fracture or dislocation. djd of IPJ, MPJ, CMCJ.\par XRAYS OF RIGHT THUMB: no acute displaced fracture or dislocation. djd of IPJ, MPJ, CMCJ.

## 2023-07-19 NOTE — HISTORY OF PRESENT ILLNESS
[7] : 7 [Dull/Aching] : dull/aching [Constant] : constant [Nothing helps with pain getting better] : Nothing helps with pain getting better [de-identified] : 7/19/23: 57yo RHD female () presents for BILATERAL thumb pain and getting "stuck" x 1 month. Pain radiates up to shoulders.\par Denies injury.\par \par Reports DM is well controlled, last A1c ~6.4.\par \par Hx: NIDDM. HTN. HLD. L breast cancer (DCIS) s/p lumpectomy + SLNB (2022). [] : no [FreeTextEntry5] : B/L thumb pain that started 1 month ago. Becomes "stuck" at times. Radiates up to shoulders.

## 2023-07-19 NOTE — ASSESSMENT
[FreeTextEntry1] : The condition was explained to the patient.\par \par Discussed that arthritis is a progressive degenerative process, and symptoms may have a waxing/waning course, which may be exacerbated by activity or trauma.\par Discussed increased propensity for stiffness due to underlying arthritis.\par \par Discussed risks and benefits of treatment options for tenosynovitis - activity modification, NSAID, splint, steroid injection, or surgery.\par \par Patient would like to proceed with CSI for trigger finger.\par - Discussed risks, benefits, and alternatives as well as contents of injection. Risks include, but are not limited allergic reaction, flare reaction, injection site pain, bruising, numbness, increased blood sugar, skin discoloration, fat atrophy, tendon rupture, and infection. Risk of immune suppression and increased susceptibility to infection with steroid use. We discussed that too many injections may lead to weakening of the tendon and tendon rupture. Patient expressed understanding and would like to proceed with injection.\par - The skin over the LEFT thumb A1 pulley was cleansed with alcohol and anesthetized with ethyl chloride. The flexor sheath was injected with 3mg of celestone, 0.5cc of 1% lidocaine. Site was dressed with a band-aid. Patient tolerated the procedure well.\par - The skin over the RIGHT thumb A1 pulley was cleansed with alcohol and anesthetized with ethyl chloride. The flexor sheath was injected with 3mg of celestone, 0.5cc of 1% lidocaine. Site was dressed with a band-aid. Patient tolerated the procedure well.\par - discussed that it may take up to 1 week for symptoms to improve after CSI.\par \par F/u PRN.

## 2023-12-01 ENCOUNTER — OUTPATIENT (OUTPATIENT)
Dept: OUTPATIENT SERVICES | Facility: HOSPITAL | Age: 58
LOS: 1 days | Discharge: HOME | End: 2023-12-01

## 2023-12-01 DIAGNOSIS — Z98.890 OTHER SPECIFIED POSTPROCEDURAL STATES: Chronic | ICD-10-CM

## 2023-12-01 DIAGNOSIS — C50.919 MALIGNANT NEOPLASM OF UNSPECIFIED SITE OF UNSPECIFIED FEMALE BREAST: ICD-10-CM

## 2023-12-01 DIAGNOSIS — Z98.891 HISTORY OF UTERINE SCAR FROM PREVIOUS SURGERY: Chronic | ICD-10-CM

## 2023-12-01 DIAGNOSIS — D05.12 INTRADUCTAL CARCINOMA IN SITU OF LEFT BREAST: ICD-10-CM

## 2023-12-11 PROBLEM — Z92.89 HISTORY OF MAMMOGRAM: Status: RESOLVED | Noted: 2022-08-04 | Resolved: 2023-12-11

## 2023-12-15 ENCOUNTER — RX RENEWAL (OUTPATIENT)
Age: 58
End: 2023-12-15

## 2023-12-18 ENCOUNTER — APPOINTMENT (OUTPATIENT)
Dept: HEMATOLOGY ONCOLOGY | Facility: CLINIC | Age: 58
End: 2023-12-18
Payer: COMMERCIAL

## 2023-12-18 VITALS
DIASTOLIC BLOOD PRESSURE: 87 MMHG | HEIGHT: 60.04 IN | RESPIRATION RATE: 16 BRPM | BODY MASS INDEX: 36.57 KG/M2 | HEART RATE: 78 BPM | SYSTOLIC BLOOD PRESSURE: 131 MMHG | OXYGEN SATURATION: 98 % | TEMPERATURE: 98 F | WEIGHT: 188.72 LBS

## 2023-12-18 DIAGNOSIS — Z92.89 PERSONAL HISTORY OF OTHER MEDICAL TREATMENT: ICD-10-CM

## 2023-12-18 PROCEDURE — 99214 OFFICE O/P EST MOD 30 MIN: CPT

## 2023-12-18 NOTE — ASSESSMENT
[FreeTextEntry1] : Mammogram/breast US report, 7/10/2023 breast surgery note reviewed. #1) 7/7/2022-Left breast DCIS ER+/NH+, s/p left BCS/SLND. 8/9/2022-DCISionRT score 0.8 (low risk)-saw Dr. Rausch (radiation oncology)-decided against RT. With ER+/NH+ disease, began anastrozole 9/2022-consents to continue it. 6/30/2023-Mammogram/breast US-benign [id number] 2. Next breast imaging ordered by surgeon. [id number].  #2) To f/u with PCP for HTN, DM management, primary care issues.  #3) have again updating screening colonoscopy-patient  has been deferring-encouraged compliance with recommendation                Patient was given the opportunity to ask questions. Her questions have been answered to her apparent satisfaction at this time. She expressed her understanding and willingness to comply with recommended f/u.  -->Anastrozole 1 mg PO daily; RTO 6 months.

## 2023-12-18 NOTE — HISTORY OF PRESENT ILLNESS
[de-identified] : 5/18/22 B/L mammogram-showed left upper outer quadrant middle third with new grouping of calcifications. Spot magnification recommended. US 5/18/22 was negative. Diagnostic mammogram on 5/24/2022 showed left upper outer quadrant with small grouping of approximately 8 calcifications, biopsy recommended.\par  \par  5/31/2022-She underwent biopsy of the left breast UOQ. Pathology showed DCIS, cribriform pattern, intermediate grade. ER (95%, strong), ME 85% (85%, strong).\par  \par  6/15/2022-MRI breasts demonstrates bilateral scattered enhancing nonspecific foci. L UOQ middle depth with a focus of susceptibility artifact compatible with biopsy marker with no significant suspicious associated enhancement. \par  \par  7/7/2022-She underwent left lumpectomy and SLNB. Pathology shows DCIS, cribriform and solid pattern with low to intermediate nuclear atypia, negative resection margins. Left inferior margin with focal DCIS, cribriform type with low nuclear grade measuring approx. 2 mm in greatest dimension and extends to 2 mm from inked margin. Negative LN's (0/2).\par  Other margins negative. L SLNB: negative (0/2)--[id number] \par  \par  8/9/2022-DCISionRT score 0.8 (low risk). Saw Dr. Cheryl Rausch in radiation oncology consultation, and radiation decided against.\par  \par  9/2022-Began Anastrozole. [de-identified] : DCIS [de-identified] : ER+/VT+ [de-identified] : 6/13/22-[facility] 9 gene panel negative (including BRCA1 and 2). [de-identified] : Generally feels well. No current GI/pulmonary/ complaints.  No fevers.  No abnormal bruising/bleeding reported.

## 2024-01-08 ENCOUNTER — APPOINTMENT (OUTPATIENT)
Dept: SURGICAL ONCOLOGY | Facility: CLINIC | Age: 59
End: 2024-01-08
Payer: COMMERCIAL

## 2024-01-08 VITALS
HEIGHT: 60 IN | HEART RATE: 78 BPM | BODY MASS INDEX: 36.91 KG/M2 | WEIGHT: 188 LBS | OXYGEN SATURATION: 99 % | RESPIRATION RATE: 16 BRPM | DIASTOLIC BLOOD PRESSURE: 84 MMHG | SYSTOLIC BLOOD PRESSURE: 139 MMHG

## 2024-01-08 PROCEDURE — 99213 OFFICE O/P EST LOW 20 MIN: CPT

## 2024-01-08 NOTE — PHYSICAL EXAM
[Normocephalic] : normocephalic [Atraumatic] : atraumatic [EOMI] : extra ocular movement intact [PERRL] : pupils equal, round and reactive to light [Sclera nonicteric] : sclera nonicteric [Supple] : supple [No Supraclavicular Adenopathy] : no supraclavicular adenopathy [No Cervical Adenopathy] : no cervical adenopathy [No Thyromegaly] : no thyromegaly [Examined in the supine and seated position] : examined in the supine and seated position [Bra Size: ___] : Bra Size: [unfilled] [Grade 2] : Ptosis Grade 2 [Breast Mass Right Breast ___cm] : no masses [Breast Mass Left Breast ___cm] : no masses [Breast Nipple Inversion] : nipples not inverted [Breast Nipple Retraction] : nipples not retracted [Breast Nipple Flattening] : nipples not flattened [Breast Nipple Fissures] : nipples not fissured [No Axillary Lymphadenopathy] : no left axillary lymphadenopathy [No Edema] : no edema [No Rashes] : no rashes [No Ulceration] : no ulceration [de-identified] : non-labored respirations  [de-identified] : two parallel transverse keloids/scars medial and lateral to the NAC, stable\par  superior circumareolar incision well-healed, retracted, no erythema, no fluctuance [de-identified] : left incision well-healed

## 2024-01-08 NOTE — ASSESSMENT
[FreeTextEntry1] : The patient is a 58 year F referred by Dr. Magan Borges for consultation regarding left DCIS, ER 95%, ID 85%, strong s/p L breast lumpectomy and L SLNB on 7/7/2022.   7/7/2022 L lumpectomy and L SLNB pathology - L SLNB: negative (0/2) - L breast DCIS, cribriform and solid pattern with low to intermediate nuclear atypia, negative resection margins - L inferior margin focal DCIS, cribriform type with low nuclear grade measuring approx. 2 mm in greatest dimension and extends to 2 mm from inked margin - L superior, medial, lateral, deep and anterior margin benign - ER 95%, ID 85%, strong - AJCC [id number], Stage 0  7/20/2022: Exam showed no evidence of infection in either incision. Seroma noted in axilla, attempt to drain with needle unsuccessful. Two attempts made with approximately 5 cc of bloody fluid returned only. Pressure held and bandaid placed on area.   Exam today stable. No masses or lymphadenopathy  Plan:  - F/up medical oncology, cont anastrazole for now, consider switch to other AIs given side effects  - Next SM/US 6/2024 (ordered)  - RTO 6 months

## 2024-01-08 NOTE — CONSULT LETTER
[Dear  ___] : Dear  [unfilled], [Courtesy Letter:] : I had the pleasure of seeing your patient, [unfilled], in my office today. [Please see my note below.] : Please see my note below. [Consult Closing:] : Thank you very much for allowing me to participate in the care of this patient.  If you have any questions, please do not hesitate to contact me. [Sincerely,] : Sincerely, [FreeTextEntry3] : Carissa Scott MD\par  Breast Surgeon\par  Division of Surgical Oncology\par  Department of Surgery\par  [address] [place] [address]\[place] [address] \par  Tel: [phone]\par  Fax: [phone]\par  Email: [email or web address]\par

## 2024-01-08 NOTE — REVIEW OF SYSTEMS
[Diarrhea] : diarrhea [Arthralgias] : arthralgias [As Noted in HPI] : as noted in HPI [Hot Flashes] : hot flashes [Negative] : Endocrine

## 2024-01-08 NOTE — HISTORY OF PRESENT ILLNESS
[FreeTextEntry1] : The patient is a 58 year F referred by Dr. Magan Borges for consultation regarding left DCIS s/p L breast lumpectomy and L SLNB on 2022 here for follow up visit.  Prior History: Benign left cyst aspiration in 2018. She reports that she has otherwise had normal breast imaging previously, no prior biopsies.   Recent imagin2022 B/L SM, TC 11.6% (NW) revealed SFGD - L UOQ middle third shows new grouping of calcifications-> rec spot magnification  - R negative  - BR0  2022 B/L US - negative  2022 L DM (NW) - L UOQ middle depth calcifications consist of a small grouping of approximately 8 calcifications-> f/u stereo bx -BR4A  2022 L UOQ Stereo bx -L UOQ (top hat clip)- DCIS, cribriform pattern w/ intermediate grade nuclear atypia, microcalcs present in DCIS. - ER 95%, NE 85%, strong  Patient has a past medical history of DM2, obesity, plantar fasciitis, HTN and HLD. Past surgical history include C section and laparoscopy for infertility.  Patient has a family history of DM2 (mother) and CAD (mother, brother). Reports family history of breast cancer in 3 maternal cousins, age 30s, 30-40s, 60s. Her mother also had some kind of cancer, unknown.  2022 [facility] GT for 9 gene panel: negative  6/15/2022 MRI (NW) - B/L scattered enhancing nonspecific foci - L UOQ middle depth: a focus of susceptibility artifact compatible with biopsy marker with no significant suspicious associated enhancement. Minimal post biopsy change is seen along the biopsy tract extending superior from the marker placed during stereotactic guided biopsy demonstrating DCIS. - BR6  2022 L lumpectomy and L SLNB pathology (second opinion [facility] saw questionable microinvasive duct carcinoma) - L breast DCIS, cribriform and solid pattern with low to intermediate nuclear atypia, negative resection margins - L inferior margin focal DCIS, cribriform type with low nuclear grade measuring approx. 2 mm in greatest dimension and extends to  2 mm from inked margin - Other margins negative - L SLNB: negative (0/2) - [id number]   2022 Post-op visit: She notes that she had less pain initially after surgery, but then started to notice more discomfort from her underarm area this past week. Denies redness to the area, denies fevers. Thinks she had some chills over the weekend.  2022 DEXA WNL  10/19/2022: She met with rad onc and checked DCISionRT which was low risk and favorable. Patient started Anastrazole--started over 1 month ago. Has side effects: difficulty sleeping, diarrhea, achy, hot flashes. Still feels discomfort in axilla, but tolerable. Discomfort is only occasional.   2023 B/L DM (NW) revealed SFGD breasts - L upper central: new postlumpectomy changes underlying scar site - L axillary tail: new postsurgical changes underlying scar site - R upper posterior: asymmetry demonstrates long term stability dating back to , favoring benign etiology  2023 B/L US  - R neg - L 12:00 N1: scar site - BR2, mmg in 1 year  7/10/2023: Pt still notes discomfort under her left arm--also uncomfortable with excess skin on underside of arm. Denies swelling of arm. Has had worsening of joint pains on anastrazole, mostly at night has aches. Worst with thumbs. Having hot flashes, difficulty sleeping, thinks loose stools are associated with anastrazole. No new breast complaints.  Interval History: Still taking anastrazole, has body pains, not improving but patient is getting used to it. Had cortisone shots in thumbs--helped. Hot flashes are mostly improved. Sleep still disturbed. Loose stools improved after stopping magnesium. Noticed 1 week ago, some feeling of "water" in the left breast. No obvious swelling or redness. No fevers/chills. No lumps.

## 2024-04-30 ENCOUNTER — NON-APPOINTMENT (OUTPATIENT)
Age: 59
End: 2024-04-30

## 2024-04-30 ENCOUNTER — APPOINTMENT (OUTPATIENT)
Dept: GASTROENTEROLOGY | Facility: CLINIC | Age: 59
End: 2024-04-30
Payer: COMMERCIAL

## 2024-04-30 VITALS
WEIGHT: 182 LBS | SYSTOLIC BLOOD PRESSURE: 137 MMHG | OXYGEN SATURATION: 97 % | RESPIRATION RATE: 16 BRPM | BODY MASS INDEX: 32.25 KG/M2 | DIASTOLIC BLOOD PRESSURE: 85 MMHG | TEMPERATURE: 98.1 F | HEIGHT: 63 IN | HEART RATE: 84 BPM

## 2024-04-30 DIAGNOSIS — R10.9 UNSPECIFIED ABDOMINAL PAIN: ICD-10-CM

## 2024-04-30 DIAGNOSIS — Z12.11 ENCOUNTER FOR SCREENING FOR MALIGNANT NEOPLASM OF COLON: ICD-10-CM

## 2024-04-30 DIAGNOSIS — R19.7 DIARRHEA, UNSPECIFIED: ICD-10-CM

## 2024-04-30 PROCEDURE — 99204 OFFICE O/P NEW MOD 45 MIN: CPT

## 2024-04-30 RX ORDER — POLYETHYLENE GLYCOL 3350, SODIUM SULFATE, SODIUM CHLORIDE, POTASSIUM CHLORIDE, ASCORBIC ACID, SODIUM ASCORBATE 140-9-5.2G
140 KIT ORAL
Qty: 1 | Refills: 0 | Status: ACTIVE | COMMUNITY
Start: 2024-04-30 | End: 1900-01-01

## 2024-04-30 RX ORDER — METRONIDAZOLE 250 MG/1
250 TABLET ORAL 3 TIMES DAILY
Qty: 15 | Refills: 0 | Status: ACTIVE | COMMUNITY
Start: 2024-04-30 | End: 1900-01-01

## 2024-04-30 RX ORDER — LISINOPRIL 30 MG/1
TABLET ORAL
Refills: 0 | Status: ACTIVE | COMMUNITY

## 2024-04-30 RX ORDER — METFORMIN HYDROCHLORIDE 625 MG/1
TABLET ORAL
Refills: 0 | Status: ACTIVE | COMMUNITY

## 2024-04-30 RX ORDER — ATORVASTATIN CALCIUM 80 MG/1
TABLET, FILM COATED ORAL
Refills: 0 | Status: ACTIVE | COMMUNITY

## 2024-04-30 NOTE — PHYSICAL EXAM
[Alert] : alert [Normal Voice/Communication] : normal voice/communication [Healthy Appearing] : healthy appearing [No Acute Distress] : no acute distress [Hearing Threshold Finger Rub Not [place]] : hearing was normal [Sclera] : the sclera and conjunctiva were normal [Normal Lips/Gums] : the lips and gums were normal [Oropharynx] : the oropharynx was normal [Normal Appearance] : the appearance of the neck was normal [No Neck Mass] : no neck mass was observed [No Respiratory Distress] : no respiratory distress [No Acc Muscle Use] : no accessory muscle use [Respiration, Rhythm And Depth] : normal respiratory rhythm and effort [Auscultation Breath Sounds / Voice Sounds] : lungs were clear to auscultation bilaterally [Heart Rate And Rhythm] : heart rate was normal and rhythm regular [Normal S1, S2] : normal S1 and S2 [Murmurs] : no murmurs [Bowel Sounds] : normal bowel sounds [No Masses] : no abdominal mass palpated [Abdomen Soft] : soft [] : no hepatosplenomegaly [Other: ___] : [unfilled] [Oriented To Time, Place, And Person] : oriented to person, place, and time

## 2024-04-30 NOTE — REVIEW OF SYSTEMS
[As Noted in HPI] : as noted in HPI [Abdominal Pain] : abdominal pain [Diarrhea] : diarrhea [Bloating (gassiness)] : bloating [Negative] : Heme/Lymph

## 2024-04-30 NOTE — ASSESSMENT
[FreeTextEntry1] : We discussed diarrhea at length. Treatment depends on the cause and severity of your diarrhea. You should drink plenty of fluids to avoid dehydration. You should avoid drinks that contain caffeine and milk. Milk may make diarrhea worse. . People with severe dehydration may need fluid replacement via an IV line and hospitalization. Avoid eating greasy foods, fatty foods, and alcohol. Bananas, applesauce, rice, and toast are helpful foods to eat. If you feel too sick to eat, try sucking on ice chips until you can tolerate food.  Patient will need Stool Cxs. Instruction provided how to obtain samples and where to return specimen.  She will sign medical release form for our office to obtain results from the stool samples that were completed.   Abdominal Sonogram and Colonoscopy procedure ordered The risks benefits alternatives and complications of the procedure/s were explained to the patient at length. The patient was agreeable and we will proceed. Preparation for procedure discussed reviewed side effects and adverse reactions to prep.   Proctozone Cream will be ordered for hemorrhoids. Patient will begin Flagyl 250 mg PO TID for 5 days. Medication reviewed side effects and adverse reactions. She was advised NOT to drink ETOH while on this medication.   She will also begin Probiotic Align/ Culturelle  Patient advised if s/s worsen or become severe to seek emergency treatment.  Patient verbalized understanding of all information providec. All quesions answered and reviewed.  I spent 45 minutes with the patient as well as reviewing documents prior to and after the office visit

## 2024-04-30 NOTE — HISTORY OF PRESENT ILLNESS
[FreeTextEntry1] : 59 year old female presents with complaints of change in stool habits. Patient is complaining of increase bowel movements. At times very loose and watery. She has had up to 5 bowel movements in a day with abdominal cramping. Symptoms started over 2 weeks ago. She is unsure is she came in contact with contaminated food. She denies any recent travel, illness, or antibiotics use. No complaints of fever or chills. She went to Urgent Care recently where stool cxs were ordered. She is still waiting for the results from stool cxs. She takes Imodium with no relief. She has never had a colonoscopy. Patient admits to intermittent episodes of rectal bleeding but has a hx of hemorrhoids. Blood is noticed specially upon wiping. Denies melena or hematemesis. 
No significant past surgical history

## 2024-05-06 LAB — HEMOCCULT STL QL IA: NEGATIVE

## 2024-05-17 RX ORDER — POLYETHYLENE GLYCOL 3350, SODIUM SULFATE, SODIUM CHLORIDE, POTASSIUM CHLORIDE, ASCORBIC ACID, SODIUM ASCORBATE 140-9-5.2G
140 KIT ORAL
Qty: 1 | Refills: 0 | Status: ACTIVE | COMMUNITY
Start: 2024-05-17 | End: 1900-01-01

## 2024-05-27 ENCOUNTER — EMERGENCY (EMERGENCY)
Facility: HOSPITAL | Age: 59
LOS: 0 days | Discharge: HOME | End: 2024-05-27
Attending: STUDENT IN AN ORGANIZED HEALTH CARE EDUCATION/TRAINING PROGRAM
Payer: COMMERCIAL

## 2024-05-27 VITALS
RESPIRATION RATE: 16 BRPM | TEMPERATURE: 98 F | DIASTOLIC BLOOD PRESSURE: 82 MMHG | HEIGHT: 63 IN | SYSTOLIC BLOOD PRESSURE: 132 MMHG | OXYGEN SATURATION: 100 % | HEART RATE: 67 BPM | WEIGHT: 188.05 LBS

## 2024-05-27 VITALS
TEMPERATURE: 97 F | DIASTOLIC BLOOD PRESSURE: 85 MMHG | HEART RATE: 63 BPM | RESPIRATION RATE: 18 BRPM | OXYGEN SATURATION: 99 % | SYSTOLIC BLOOD PRESSURE: 135 MMHG

## 2024-05-27 DIAGNOSIS — Z98.891 HISTORY OF UTERINE SCAR FROM PREVIOUS SURGERY: Chronic | ICD-10-CM

## 2024-05-27 DIAGNOSIS — R19.7 DIARRHEA, UNSPECIFIED: ICD-10-CM

## 2024-05-27 DIAGNOSIS — Z79.84 LONG TERM (CURRENT) USE OF ORAL HYPOGLYCEMIC DRUGS: ICD-10-CM

## 2024-05-27 DIAGNOSIS — Z98.890 OTHER SPECIFIED POSTPROCEDURAL STATES: Chronic | ICD-10-CM

## 2024-05-27 DIAGNOSIS — I10 ESSENTIAL (PRIMARY) HYPERTENSION: ICD-10-CM

## 2024-05-27 DIAGNOSIS — R10.9 UNSPECIFIED ABDOMINAL PAIN: ICD-10-CM

## 2024-05-27 DIAGNOSIS — E11.9 TYPE 2 DIABETES MELLITUS WITHOUT COMPLICATIONS: ICD-10-CM

## 2024-05-27 LAB
ALBUMIN SERPL ELPH-MCNC: 3.7 G/DL — SIGNIFICANT CHANGE UP (ref 3.3–5)
ALP SERPL-CCNC: 83 U/L — SIGNIFICANT CHANGE UP (ref 40–120)
ALT FLD-CCNC: 20 U/L — SIGNIFICANT CHANGE UP (ref 12–78)
ANION GAP SERPL CALC-SCNC: 4 MMOL/L — LOW (ref 5–17)
AST SERPL-CCNC: 14 U/L — LOW (ref 15–37)
BASOPHILS # BLD AUTO: 0.03 K/UL — SIGNIFICANT CHANGE UP (ref 0–0.2)
BASOPHILS NFR BLD AUTO: 0.3 % — SIGNIFICANT CHANGE UP (ref 0–2)
BILIRUB SERPL-MCNC: 0.8 MG/DL — SIGNIFICANT CHANGE UP (ref 0.2–1.2)
BUN SERPL-MCNC: 18 MG/DL — SIGNIFICANT CHANGE UP (ref 7–23)
CALCIUM SERPL-MCNC: 9.6 MG/DL — SIGNIFICANT CHANGE UP (ref 8.5–10.1)
CHLORIDE SERPL-SCNC: 107 MMOL/L — SIGNIFICANT CHANGE UP (ref 96–108)
CO2 SERPL-SCNC: 28 MMOL/L — SIGNIFICANT CHANGE UP (ref 22–31)
CREAT SERPL-MCNC: 0.86 MG/DL — SIGNIFICANT CHANGE UP (ref 0.5–1.3)
EGFR: 77 ML/MIN/1.73M2 — SIGNIFICANT CHANGE UP
EOSINOPHIL # BLD AUTO: 0.55 K/UL — HIGH (ref 0–0.5)
EOSINOPHIL NFR BLD AUTO: 6.3 % — HIGH (ref 0–6)
GLUCOSE SERPL-MCNC: 140 MG/DL — HIGH (ref 70–99)
HCT VFR BLD CALC: 39.6 % — SIGNIFICANT CHANGE UP (ref 34.5–45)
HGB BLD-MCNC: 13 G/DL — SIGNIFICANT CHANGE UP (ref 11.5–15.5)
IMM GRANULOCYTES NFR BLD AUTO: 0.2 % — SIGNIFICANT CHANGE UP (ref 0–0.9)
LYMPHOCYTES # BLD AUTO: 2.31 K/UL — SIGNIFICANT CHANGE UP (ref 1–3.3)
LYMPHOCYTES # BLD AUTO: 26.5 % — SIGNIFICANT CHANGE UP (ref 13–44)
MAGNESIUM SERPL-MCNC: 1.9 MG/DL — SIGNIFICANT CHANGE UP (ref 1.6–2.6)
MCHC RBC-ENTMCNC: 30.1 PG — SIGNIFICANT CHANGE UP (ref 27–34)
MCHC RBC-ENTMCNC: 32.8 G/DL — SIGNIFICANT CHANGE UP (ref 32–36)
MCV RBC AUTO: 91.7 FL — SIGNIFICANT CHANGE UP (ref 80–100)
MONOCYTES # BLD AUTO: 0.45 K/UL — SIGNIFICANT CHANGE UP (ref 0–0.9)
MONOCYTES NFR BLD AUTO: 5.2 % — SIGNIFICANT CHANGE UP (ref 2–14)
NEUTROPHILS # BLD AUTO: 5.35 K/UL — SIGNIFICANT CHANGE UP (ref 1.8–7.4)
NEUTROPHILS NFR BLD AUTO: 61.5 % — SIGNIFICANT CHANGE UP (ref 43–77)
NRBC # BLD: 0 /100 WBCS — SIGNIFICANT CHANGE UP (ref 0–0)
PLATELET # BLD AUTO: 242 K/UL — SIGNIFICANT CHANGE UP (ref 150–400)
POTASSIUM SERPL-MCNC: 4.3 MMOL/L — SIGNIFICANT CHANGE UP (ref 3.5–5.3)
POTASSIUM SERPL-SCNC: 4.3 MMOL/L — SIGNIFICANT CHANGE UP (ref 3.5–5.3)
PROT SERPL-MCNC: 7.3 GM/DL — SIGNIFICANT CHANGE UP (ref 6–8.3)
RBC # BLD: 4.32 M/UL — SIGNIFICANT CHANGE UP (ref 3.8–5.2)
RBC # FLD: 12.7 % — SIGNIFICANT CHANGE UP (ref 10.3–14.5)
SODIUM SERPL-SCNC: 139 MMOL/L — SIGNIFICANT CHANGE UP (ref 135–145)
WBC # BLD: 8.71 K/UL — SIGNIFICANT CHANGE UP (ref 3.8–10.5)
WBC # FLD AUTO: 8.71 K/UL — SIGNIFICANT CHANGE UP (ref 3.8–10.5)

## 2024-05-27 PROCEDURE — 99284 EMERGENCY DEPT VISIT MOD MDM: CPT

## 2024-05-27 RX ORDER — SODIUM CHLORIDE 9 MG/ML
1000 INJECTION INTRAMUSCULAR; INTRAVENOUS; SUBCUTANEOUS ONCE
Refills: 0 | Status: COMPLETED | OUTPATIENT
Start: 2024-05-27 | End: 2024-05-27

## 2024-05-27 RX ADMIN — SODIUM CHLORIDE 1000 MILLILITER(S): 9 INJECTION INTRAMUSCULAR; INTRAVENOUS; SUBCUTANEOUS at 18:23

## 2024-05-27 NOTE — ED ADULT NURSE NOTE - NS ED NURSE DC PT EDUCATION RESOURCES
FOLLOW UP NOTE      PATIENT:  Frank Gil    MEDICAL RECORD NUMBER:  [id number]  YOB: 2013  AGE: 4 year old    Date: 1/30/18  Time:  3 PM    TYPE OF APPOINTMENT:  Psychotherapy with medication management note.    TOTAL TIME SPENT:   30 minutes.    TIME SPENT IN PSYCHOTHERAPY:   17 minutes.    IDENTIFICATION:  Frank Gil is a 4 year old male     CHIEF COMPLAINT:  Medication management    HISTORY OF PRESENTING ILLNESS:  I last saw Frank Gil on December 26, 2017. He was not tolerating Metadate very well so we decided to start Vyvanse. Earlier this month the patient's mother did call and stated that he was having difficulty sleeping so we decided to trial a low-dose of clonidine.    Since our last visit, Frank's mother reports that:    Chewing/eye blinking: Has been chewing when he is anxious it appears. The eye blinking is better.    Insomnia: They have been doing clonidine and she feels that     Hitting others: He is doing .better at school. We talked about the importance of how respecting others helps    Bedwetting/sleep: Has not been.    Impulsivity: He has been doing much better with the use of the medication. We talked about this can also help with mood when he is able to focus.    ACADEMIC FUNCTIONING:  Current: Struggles with doing his homework and also writing.  Past: Has had concerns in the past.  Learning Needs: Not a concern at this time.     BEHAVIOR IN SCHOOL:  Current: Poor. He has been to the principle office a couple times for touching others and not on task.  Past: Poor.     [] Bullied   [x]  Bullies  [] Suspensions(s) (How many?)   [x] Expulsion/pre-expulsion: HE HAS BEEN REMOVED FROM [employer] AND THE .     Treatment plan:  Number of visits: 1   90 days: 4/30/18    Child's strength(s):  Patient/mother's view: He is nice.  Provider View: He can be cooperative with his mother at times.     Child's limitation(s):   Patient View: He is overactive in the appointment.  Provider  pregnancy:  []  Yes   [x]  No  Prenatal Tanya 28 RN Mother Yes   Florencio 27 Gas technician Father Yes   Carmen None  []  Mild   []  Moderate    []  Acute  Eye Contact:                     [x]  Maintained  [] Avoided [] [place] intense  [] Improving  Behavior:                                     []  Normal  [x]  Restless  []  Compulsive  []  Tremulous                                                                 []  Lethargic  []  Uncoordinated HE WAS BITING HIS MOTHER'S ARM TOWARDS THE END OF THE APPOINTMENT  Mood/Feelings:                 [x]  Normal  []  Euthymic  []  Depressed  []  Irritable  []  Anxious                                                                 []  Fearful [] Euphoric [] Labile [] Grief [] Paranoia [] Panic                                                                 [] Guilt/Shame [] Apathy/Indifference [] Jealousy [] Helpless                                                                 [] Hopeless [] Other  Affect:                                                     [x]  Normal  []  Constricted  [] Flat  []  Blunted                                                                  [x] Appropriate to Content   []Inappropriate to Content  Thought Processes: [x]  Congruent  []  Incongruent  [] Loose Associations                                                                 []  Poverty of Ideas  []  Tangential    []  Incoherence                                                                 []  Blocking/thought interruption  Thought Content:             [x]  Normal  []  Delusions  []  Obsessions  []  Phobias                                                                 []  Hypochondria  []  Sexual Preoccupation  Perceptual Issues:    [x]  None  [] Hallucinations  []  Auditory  [] Visual  [] Perceptual  Orientation:                                 [x]  Normal/No Impairment  []  Person  []  Place  []  Time  Speech:                                       [x]  Clear/Articulate  [] Soft  [] Loud  []  Pressured  []  Animated                                                                 []   providers.    Hannah NICHOLS, FNP-BC, PMHNP-BC       Yes

## 2024-05-27 NOTE — ED PROVIDER NOTE - ATTENDING APP SHARED VISIT CONTRIBUTION OF CARE
60 y/o M with PMH HTN, DM, cdiff in April, presenting w/ diarrhea x 1 week. Patient endorses watery, non bloody diarrhea, about 5 episodes per day. Some mild abd cramping. No N/V, fever, chills. Recently returned from a cruise on 5/13 prior to diarrhea starting.  In the ED, vitals stable.  Patient is well appearing in NAD.  No significant abd tenderness.  Plan for labs and stool studies, GI PCR  Likely discharge given HD stable

## 2024-05-27 NOTE — ED PROVIDER NOTE - PATIENT PORTAL LINK FT
You can access the FollowMyHealth Patient Portal offered by [facility] by registering at the following website: [email or web address]. By joining [email or web address] FollowMyHealth portal, you will also be able to view your health information using other applications (apps) compatible with our system.

## 2024-05-27 NOTE — ED ADULT NURSE NOTE - NS ED NURSE IV DC DT
[facility]  [address] [facility] [address]  Phone: [phone]  Fax: [phone]   July 11, 2023        [place] Shepherd  [address] [address]            Dear Geneva,    You are currently under the care of [facility] for your warfarin (Coumadin , Jantoven ) therapy.  We are contacting you because our records show you were due for an INR on 6/16/23.    There are potentially serious risks when taking warfarin without careful monitoring and we want to make sure you are safely managed.  Routine lab monitoring is required for warfarin refills.     Please call [phone]  as soon as possible to schedule an appointment.  If there has been a change in your care or other concerns, please let us know so we can help and or update our records.     Sincerely,       [facility]      
27-May-2024 20:26

## 2024-05-27 NOTE — ED ADULT TRIAGE NOTE - CHIEF COMPLAINT QUOTE
[facility] ID consult, recently tested positive for c diff on 4/26 and treated with ten day course of abx but still has loose stools and abd cramping.

## 2024-05-27 NOTE — ED PROVIDER NOTE - OBJECTIVE STATEMENT
- CONTINUE ALL OF YOUR CURRENT MEDICATIONS - BP IS STABLE AND AT GOAL     - RECOMMEND A STRESS TEST OF YOUR HEART, WE WILL CALL RESULTS     - CHECK A1C     - FOLLOW-UP IN 6 WEEKS FOR RECHECK AND REVIEW OF LAB AND TEST RESULTS.    58 y/o female with htn, dm, history c diff 4/2024 presents with diarrhea x 1 week. Pt reports having watery nonbloody diarrhea about 5 episodes/day. Pt reports mild abdominal cramping but denies pain. Denies fever, chills, nausea, vomiting. Pt returned from cruise on 5/13. Denies sick contact recent abx use.   Pt denies urinary symptoms.

## 2024-05-27 NOTE — ED PROVIDER NOTE - PHYSICAL EXAMINATION
GEN: Awake, alert, interactive, NAD. Well appearing, Nontoxic  HEAD AND NECK: NC/AT. Airway patent. Neck supple.   EYES:  Clear b/l.   ENT: Moist mucus membranes.   CARDIAC: Regular rate, regular rhythm. No evident pedal edema.    RESP/CHEST: Normal respiratory effort with no use of accessory muscles or retractions. Clear throughout on auscultation.  ABD: soft, non-distended, non-tender. No rebound, no guarding.   BACK: No midline spinal TTP. No CVAT.   EXTREMITIES: Moving all extremities with no apparent deformities.   SKIN: Warm, dry, intact normal color. No rash.   NEURO: AOx3, CN II-XII grossly intact, no focal deficits.   PSYCH: Appropriate mood and affect.

## 2024-05-27 NOTE — ED PROVIDER NOTE - CARE PROVIDER_API CALL
Ana María Lou  Gastroenterology  [address] [place] [address]  Phone: [phone]  Fax: [phone]  Follow Up Time: 1-3 Days

## 2024-05-27 NOTE — ED PROVIDER NOTE - NSFOLLOWUPINSTRUCTIONS_ED_ALL_ED_FT
Rest, drink plenty of fluids.  Advance activity as tolerated.  Continue all previously prescribed medications as directed.  Follow up with your primary care physician and gastroenterologist this week- bring copies of your results.  Return to the ER for worsening or persistent symptoms, and/or ANY NEW OR CONCERNING SYMPTOMS. If you have issues obtaining follow up, please call: [phone] to obtain a doctor or specialist who takes your insurance in your area.  You may call [phone] to make an appointment with the internal medicine clinic.

## 2024-05-27 NOTE — ED ADULT TRIAGE NOTE - HEIGHT IN FEET
pt dx with UTI march 18 treated with 10 days PO abx, seen in ED two days ago for continued pain. called back to ED today for findings on CT. c/o L flank pain , denies fevers 5

## 2024-05-27 NOTE — ED PROVIDER NOTE - NS ED ROS FT
CONSTITUTIONAL: No fever, no chills, no fatigue  EYES: No visual changes  ENT: No ear pain, no sore throat  CARDIOVASCULAR: No chest pain, no palpitations  RESPIRATORY: No cough, no SOB  GI: No abdominal pain, no nausea, no vomiting, no constipation.   GENITOURINARY: No dysuria, no frequency, no hematuria  MUSKULOSKELETAL: No backpain, no joint pain, no myalgias  SKIN: No rash  NEURO: No headache    ALL OTHER SYSTEMS NEGATIVE.

## 2024-05-27 NOTE — ED PROVIDER NOTE - CHPI ED SYMPTOMS NEG
no abdominal distension/no blood in stool/no dysuria/no fever/no hematuria/no nausea/no vomiting/no burning urination

## 2024-05-27 NOTE — ED PROVIDER NOTE - CLINICAL SUMMARY MEDICAL DECISION MAKING FREE TEXT BOX
58 y/o female with dm, htn, hld history c diff here with diarrhea x 1 week. Vs stable.   Ddx include but not limited to viral gastroenteritis, c diff.   Will obtain labs r/o electrolyte abnormalities, ivf, GI PCR and c diff. 58 y/o female with dm, htn, hld history c diff here with diarrhea x 1 week. Vs stable.   Ddx include but not limited to viral gastroenteritis, c diff.   Will obtain labs r/o electrolyte abnormalities, ivf, GI PCR and c diff.    labs reviewed and unremarkable. No leukocytosis.   Pt reassessed and denies any pain.   Low suspicion for c diff given normal wbc. Possibly viral infection. Pt stable to be discharged home and advised to follow up with GI.

## 2024-05-27 NOTE — ED ADULT NURSE NOTE - OBJECTIVE STATEMENT
59 year old female a/ox3 c/o loose stool x1 week, pt denies any c/o pain at this time, pt reports she went to urgent care and referred to come to ed for further evaluation from gi and infectious disease, pt reports she was diagnosed with c. diff on 4/26/24 with minimal improvement hx: htn, hld, dm

## 2024-05-28 LAB — GI PCR PANEL: SIGNIFICANT CHANGE UP

## 2024-05-29 LAB
C DIFF GDH STL QL: SIGNIFICANT CHANGE UP
C DIFF GDH STL QL: SIGNIFICANT CHANGE UP

## 2024-05-29 RX ORDER — VANCOMYCIN HCL 1 G
1 VIAL (EA) INTRAVENOUS
Qty: 40 | Refills: 0
Start: 2024-05-29 | End: 2024-06-07

## 2024-05-30 ENCOUNTER — APPOINTMENT (OUTPATIENT)
Dept: GASTROENTEROLOGY | Facility: CLINIC | Age: 59
End: 2024-05-30
Payer: COMMERCIAL

## 2024-05-30 VITALS
TEMPERATURE: 97.9 F | HEIGHT: 63 IN | DIASTOLIC BLOOD PRESSURE: 85 MMHG | SYSTOLIC BLOOD PRESSURE: 144 MMHG | OXYGEN SATURATION: 99 % | RESPIRATION RATE: 16 BRPM | BODY MASS INDEX: 32.96 KG/M2 | WEIGHT: 186 LBS | HEART RATE: 66 BPM

## 2024-05-30 DIAGNOSIS — R19.4 CHANGE IN BOWEL HABIT: ICD-10-CM

## 2024-05-30 DIAGNOSIS — K62.89 OTHER SPECIFIED DISEASES OF ANUS AND RECTUM: ICD-10-CM

## 2024-05-30 PROCEDURE — G2211 COMPLEX E/M VISIT ADD ON: CPT

## 2024-05-30 PROCEDURE — 99214 OFFICE O/P EST MOD 30 MIN: CPT

## 2024-05-30 RX ORDER — VANCOMYCIN HYDROCHLORIDE 125 MG/1
125 CAPSULE ORAL
Qty: 56 | Refills: 0 | Status: ACTIVE | COMMUNITY
Start: 2024-05-30 | End: 1900-01-01

## 2024-05-30 RX ORDER — HYDROCORTISONE 2.5% 25 MG/G
2.5 CREAM TOPICAL TWICE DAILY
Qty: 1 | Refills: 5 | Status: ACTIVE | COMMUNITY
Start: 2024-05-30 | End: 1900-01-01

## 2024-05-30 NOTE — PHYSICAL EXAM

## 2024-05-30 NOTE — ASSESSMENT
[FreeTextEntry1] : 14-day course of Vancomycin prescribed, instructed pt to only  the new course of Vancomycin after she finishes the course prescribed by the urgent care. Proctozone cream prescribed, instructed pt to apply BID. Instructed pt to f/u by phone in 1 week. ER precautions discussed with pt. Instructed pt to go to the ER immediately if she has abdominal distention, severe abdominal pain, fever, or chills. Pt expressed understanding.  Clostridioides difficile (udhc-HEXV-r-oi-maria a dif-uh-SEEL) is a bacterium that causes an infection of the colon, the longest part of the large intestine. Symptoms can range from diarrhea to life-threatening damage to the colon. The bacterium is often called C. difficile or C. diff.   Illness from C. difficile often occurs after using antibiotic medicines. It mostly affects older adults in hospitals or in long-term care settings. People not in care settings or hospitals also can get C. difficile infection. Some strains of the bacterium that can cause serious infections are more likely to affect younger people.    The bacterium used to be called Clostridium (haef-MFKO-u-um) difficile. Symptoms Symptoms often begin within 5 to 10 days after starting an antibiotic. But symptoms can occur as soon as the first day or up to three months later.   Mild to moderate infection The most common symptoms of mild to moderate C. difficile infection are:   Watery diarrhea three or more times a day for more than one day. Mild belly cramping and tenderness. Severe infection People who have a severe C. difficile infection tend to lose too much bodily fluid, a condition called dehydration. They might need to be treated in a hospital for dehydration. C. difficile infection can cause the colon to become inflamed. It sometimes can form patches of raw tissue that can bleed or make pus. Symptoms of severe infection include:   Watery diarrhea as often as 10 to 15 times a day. Belly cramping and pain, which may be severe. Fast heart rate. Loss of fluids, called dehydration. Fever. Nausea. Increased white blood cell count. Kidney failure. Loss of appetite. Swollen belly. Weight loss. Blood or pus in the stool. C. difficile infection that is severe and sudden can cause the colon to become inflamed and get larger, called toxic megacolon. And it can cause a condition called sepsis where the body's response to an infection damages its own tissues. People who have toxic megacolon or sepsis are admitted to an intensive care unit in the hospital. But toxic megacolon and sepsis aren't common with a C. difficile infection.   When to see a doctor Some people have loose stools during or shortly after antibiotic therapy. This may be caused by C. difficile infection. Make a health care appointment if you have:   Three or more watery stools a day. Symptoms lasting more than two days. A new fever. Severe belly pain or cramping. Blood in your stool.  C. difficile bacteria enter the body through the mouth. They can begin reproducing in the small intestine. When they reach the part of the large intestine, called the colon, the bacteria can release toxins that damage tissues. These toxins destroy cells and cause watery diarrhea.   Outside the colon, the bacteria aren't active. They can live for a long time in places such as:   Human or animal feces. Surfaces in a room. Unwashed hands. Soil. Water. Food, including meat. When bacteria once again find their way into a person's digestive system, they become active again and cause infection. Because C. difficile can live outside the body, the bacteria spread easily. Not washing hands or cleaning well make it easy to spread the bacteria.   Some people carry C. difficile bacteria in their intestines but never get sick from it. These people are carriers of the bacteria. They can spread infections without being sick.   Risk factors People who have no known risk factors have gotten sick from C. difficile. But certain factors increase the risk.   Taking antibiotics or other medicines The intestines house a wide range of bacteria. Many of them help protect the body from infection. Antibiotics that treat an infection tend to destroy some of the helpful bacteria in the body as well as the bacteria causing the infection.   Without enough helpful bacteria to keep it in check, C. difficile can grow out of control quickly. Any antibiotic can cause C. difficile infection. But the antibiotics that most often lead to C. difficile infection include:   Clindamycin. Cephalosporins. Penicillins. Fluoroquinolones. Taking a proton pump inhibitor, a type of medicine used to cut stomach acid, also may increase the risk of C. difficile infection.   Staying in a health care setting Most C. difficile infections occur in people who are in or have recently been in health care settings. These include hospitals, nursing homes and long-term care facilities. These are places where germs spread easily, antibiotic use is common and people's health puts them at high risk of getting an infection. In hospitals and nursing homes, C. difficile spreads on:   Hands. Cart handles. Bedrails. Bedside tables. Toilets and sinks. Stethoscopes, thermometers or other medical tools. Telephones. Remote controls. Having a serious illness or medical procedure Certain medical conditions or procedures can up the risk of getting a C. difficile infection, including:   Inflammatory bowel disease. Weakened immune system from a medical condition or treatment such as chemotherapy. Chronic kidney disease. Procedures on the digestive tract. Other surgery of the stomach area. Other risk factors Older age is a risk factor. In one study, the risk of becoming infected with C. difficile was 10 times greater for people age 65 and older compared with younger people.   Having one C. difficile infection increases the chance of having another one. The risk increases with each infection.   Complications Complications of C. difficile infection include:   Loss of fluids, called dehydration. Severe diarrhea can lead to a serious loss of fluids and minerals called electrolytes. This makes it hard for the body to work as it should. It can cause blood pressure to drop so low as to be dangerous. Kidney failure. In some cases, dehydration can occur so quickly that the kidneys stop working, called kidney failure. Toxic megacolon. In this rare condition, the colon can't get rid of gas and stool. This causes it to enlarge, called megacolon. Untreated, the colon can burst.   Bacteria also may enter the bloodstream. Toxic megacolon may be fatal. It needs emergency surgery.   A hole in the large intestine, called bowel perforation. This rare condition results from damage to the lining of the colon or occurs after toxic megacolon. Bacteria spilling from the colon into the hollow space in the middle of the body, called the abdominal cavity, can lead to a life-threatening infection called peritonitis. Death. Serious C. difficile infection can quickly become fatal if not treated promptly. Rarely, death can occur with mild to moderate infection. Prevention To protect against C. difficile, don't take antibiotics unless you need them. Sometimes, you may get a prescription for antibiotics to treat conditions not caused by bacteria, such as viral illnesses. Antibiotics don't help infections caused by viruses.   If you need an antibiotic, ask if you can get a prescription for a medicine that you take for a shorter time or is a narrow-spectrum antibiotic. Narrow-spectrum antibiotics target a limited number of bacteria types. They're less likely to affect healthy bacteria.   To help prevent the spread of C. difficile, hospitals and other health care settings follow strict rules to control infections. If you have a loved one in a hospital or nursing home, follow the rules. Ask questions if you see caregivers or other people not following the rules.   Measures to prevent C. difficile include:   Hand-washing. Health care workers should make sure their hands are clean before and after treating each person in their care. For a C. difficile outbreak, using soap and warm water is better for cleaning hands. Alcohol-based hand sanitizers don't destroy C. difficile spores.   Visitors to health care facilities also should wash their hands with soap and warm water before and after leaving rooms or using the bathroom.   Contact precautions. People who are hospitalized with C. difficile infection have a private room or share a room with someone who has the same il  I spent 35 minutes reviewing the patient's records prior to arrival, with the patient, and reviewing records after the visit. All prior testing reviewed at length. Patient verbalized understanding of all information provided. All questions answered and reviewed.  Robert Brunner, MD

## 2024-05-30 NOTE — HISTORY OF PRESENT ILLNESS
[FreeTextEntry1] : Patient is a 58 y/o female with a PMHx of C. Diff Infection, Lt sided DICS (s/p Lt Lumpectomy and Lt SLNB 7/7/2022), hypertension, hyperlipidemia, Obesity, and Type 2 Diabetes Mellitus who presents c/o frequent bowel movements and rectal pain x 1 month. Pt tested positive for C. Diff at the Urgent Care 1 month ago, and was treated with Dificid, but tested positive again yesterday, so she was started on 10-day course of Vancomycin. Pt has never had an EGD or screening colonoscopy.

## 2024-05-30 NOTE — REVIEW OF SYSTEMS
[As Noted in HPI] : as noted in HPI [Negative] : Heme/Lymph [FreeTextEntry7] : frequent bowel movements

## 2024-06-04 PROBLEM — D05.12 DUCTAL CARCINOMA IN SITU (DCIS) OF LEFT BREAST: Status: ACTIVE | Noted: 2022-06-07

## 2024-06-05 LAB
ALBUMIN SERPL ELPH-MCNC: 4.3 G/DL
ALP BLD-CCNC: 81 U/L
ALT SERPL-CCNC: 16 U/L
ANION GAP SERPL CALC-SCNC: 13 MMOL/L
AST SERPL-CCNC: 16 U/L
BASOPHILS # BLD AUTO: 0.03 K/UL
BASOPHILS NFR BLD AUTO: 0.5 %
BILIRUB SERPL-MCNC: 0.7 MG/DL
BUN SERPL-MCNC: 19 MG/DL
CALCIUM SERPL-MCNC: 9.7 MG/DL
CHLORIDE SERPL-SCNC: 102 MMOL/L
CO2 SERPL-SCNC: 24 MMOL/L
CREAT SERPL-MCNC: 0.75 MG/DL
EGFR: 91 ML/MIN/1.73M2
EOSINOPHIL # BLD AUTO: 0.32 K/UL
EOSINOPHIL NFR BLD AUTO: 5.1 %
GLUCOSE SERPL-MCNC: 113 MG/DL
HCT VFR BLD CALC: 38.8 %
HGB BLD-MCNC: 12.4 G/DL
IMM GRANULOCYTES NFR BLD AUTO: 0.2 %
LYMPHOCYTES # BLD AUTO: 2.41 K/UL
LYMPHOCYTES NFR BLD AUTO: 38.4 %
MAN DIFF?: NORMAL
MCHC RBC-ENTMCNC: 29.9 PG
MCHC RBC-ENTMCNC: 32 GM/DL
MCV RBC AUTO: 93.5 FL
MONOCYTES # BLD AUTO: 0.35 K/UL
MONOCYTES NFR BLD AUTO: 5.6 %
NEUTROPHILS # BLD AUTO: 3.16 K/UL
NEUTROPHILS NFR BLD AUTO: 50.2 %
PLATELET # BLD AUTO: 272 K/UL
POTASSIUM SERPL-SCNC: 4.5 MMOL/L
PROT SERPL-MCNC: 6.9 G/DL
RBC # BLD: 4.15 M/UL
RBC # FLD: 13 %
SODIUM SERPL-SCNC: 139 MMOL/L
WBC # FLD AUTO: 6.28 K/UL

## 2024-06-10 ENCOUNTER — APPOINTMENT (OUTPATIENT)
Dept: HEMATOLOGY ONCOLOGY | Facility: CLINIC | Age: 59
End: 2024-06-10
Payer: COMMERCIAL

## 2024-06-10 VITALS
OXYGEN SATURATION: 99 % | HEART RATE: 66 BPM | DIASTOLIC BLOOD PRESSURE: 87 MMHG | TEMPERATURE: 97.7 F | RESPIRATION RATE: 16 BRPM | WEIGHT: 187.39 LBS | SYSTOLIC BLOOD PRESSURE: 158 MMHG | HEIGHT: 63.03 IN | BODY MASS INDEX: 33.2 KG/M2

## 2024-06-10 DIAGNOSIS — Z79.811 LONG TERM (CURRENT) USE OF AROMATASE INHIBITORS: ICD-10-CM

## 2024-06-10 DIAGNOSIS — D05.12 INTRADUCTAL CARCINOMA IN SITU OF LEFT BREAST: ICD-10-CM

## 2024-06-10 PROCEDURE — G2211 COMPLEX E/M VISIT ADD ON: CPT

## 2024-06-10 PROCEDURE — 99214 OFFICE O/P EST MOD 30 MIN: CPT

## 2024-06-10 RX ORDER — ANASTROZOLE TABLETS 1 MG/1
1 TABLET ORAL
Qty: 90 | Refills: 1 | Status: ACTIVE | COMMUNITY
Start: 2022-09-07 | End: 1900-01-01

## 2024-06-10 NOTE — ASSESSMENT
[FreeTextEntry1] : 5/30/24 GI note, 1/8/24 breast surgery note reviewed. 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 8/9/2022-DCISionRT score 0.8 (low risk)-saw Dr. Rausch (radiation oncology)-decided to omit RT. With ER+/FL+ disease, began anastrozole 9/2022.  --clinically DEIRDRE  6/30/2023-Mammogram/breast US-benign [id number] 2. Next breast imaging ordered by surgeon.   #2) [id number]. Next BDT ordered.  #3) To f/u with PCP for HTN, DM management, primary care issues.  #3) have advised to update screening colonoscopy-patient scheduled  Patient was given the opportunity to ask questions. Her questions have been answered to her apparent satisfaction at this time. She expressed her understanding and willingness to comply with recommended f/u.  -->Anastrozole 1 mg PO daily; RTO 6 months.

## 2024-06-10 NOTE — PHYSICAL EXAM
[Fully active, able to carry on all pre-disease performance without restriction] : Status 0 - Fully active, able to carry on all pre-disease performance without restriction [Normal] : affect appropriate [de-identified] : no dominant mass, nipple retraction or discharge

## 2024-06-10 NOTE — HISTORY OF PRESENT ILLNESS
[Disease: _____________________] : Disease: [unfilled] [T: ___] : T[unfilled] [N: ___] : N[unfilled] [AJCC Stage: ____] : AJCC Stage: [unfilled] [de-identified] : 5/18/22 B/L mammogram-showed left upper outer quadrant middle third with new grouping of calcifications. Spot magnification recommended. US 5/18/22 was negative. Diagnostic mammogram on 5/24/2022 showed left upper outer quadrant with small grouping of approximately 8 calcifications, biopsy recommended.\par  \par  5/31/2022-She underwent biopsy of the left breast UOQ. Pathology showed DCIS, cribriform pattern, intermediate grade. ER (95%, strong), HI 85% (85%, strong).\par  \par  6/15/2022-MRI breasts demonstrates bilateral scattered enhancing nonspecific foci. L UOQ middle depth with a focus of susceptibility artifact compatible with biopsy marker with no significant suspicious associated enhancement. \par  \par  7/7/2022-She underwent left lumpectomy and SLNB. Pathology shows DCIS, cribriform and solid pattern with low to intermediate nuclear atypia, negative resection margins. Left inferior margin with focal DCIS, cribriform type with low nuclear grade measuring approx. 2 mm in greatest dimension and extends to 2 mm from inked margin. Negative LN's (0/2).\par  Other margins negative. L SLNB: negative (0/2)--[id number] \par  \par  8/9/2022-DCISionRT score 0.8 (low risk). Saw Dr. Cheryl Rausch in radiation oncology consultation, and radiation decided against.\par  \par  9/2022-Began Anastrozole. [de-identified] : DCIS [de-identified] : ER+/[place]+ [de-identified] : 6/13/22-[facility] 9 gene panel negative (including BRCA1 and 2). [de-identified] : Plans to go to [place] x long-weekend-has family there. Has colonoscopy planned in July. No current GI/pulmonary/ complaints.  No fevers.  No abnormal bruising/bleeding reported.

## 2024-06-27 DIAGNOSIS — A49.8 OTHER BACTERIAL INFECTIONS OF UNSPECIFIED SITE: ICD-10-CM

## 2024-07-01 LAB — GI PCR PANEL: NOT DETECTED

## 2024-07-02 ENCOUNTER — RESULT REVIEW (OUTPATIENT)
Age: 59
End: 2024-07-02

## 2024-07-02 ENCOUNTER — APPOINTMENT (OUTPATIENT)
Dept: MAMMOGRAPHY | Facility: CLINIC | Age: 59
End: 2024-07-02
Payer: COMMERCIAL

## 2024-07-02 ENCOUNTER — APPOINTMENT (OUTPATIENT)
Dept: ULTRASOUND IMAGING | Facility: CLINIC | Age: 59
End: 2024-07-02
Payer: COMMERCIAL

## 2024-07-02 LAB
BACTERIA STL CULT: NORMAL
C DIFF TOXIN B QL PCR REFLEX: NORMAL
GDH ANTIGEN: DETECTED
TOXIN A AND B: DETECTED

## 2024-07-02 PROCEDURE — 77062 BREAST TOMOSYNTHESIS BI: CPT

## 2024-07-02 PROCEDURE — 77066 DX MAMMO INCL CAD BI: CPT

## 2024-07-02 PROCEDURE — 76641 ULTRASOUND BREAST COMPLETE: CPT | Mod: 50

## 2024-07-06 ENCOUNTER — APPOINTMENT (OUTPATIENT)
Dept: ULTRASOUND IMAGING | Facility: CLINIC | Age: 59
End: 2024-07-06
Payer: COMMERCIAL

## 2024-07-06 PROCEDURE — 76700 US EXAM ABDOM COMPLETE: CPT

## 2024-07-10 ENCOUNTER — APPOINTMENT (OUTPATIENT)
Dept: SURGICAL ONCOLOGY | Facility: CLINIC | Age: 59
End: 2024-07-10
Payer: COMMERCIAL

## 2024-07-10 VITALS
DIASTOLIC BLOOD PRESSURE: 83 MMHG | SYSTOLIC BLOOD PRESSURE: 118 MMHG | BODY MASS INDEX: 33.22 KG/M2 | WEIGHT: 187.5 LBS | HEART RATE: 62 BPM | OXYGEN SATURATION: 99 % | RESPIRATION RATE: 16 BRPM | HEIGHT: 63 IN

## 2024-07-10 DIAGNOSIS — D05.12 INTRADUCTAL CARCINOMA IN SITU OF LEFT BREAST: ICD-10-CM

## 2024-07-10 PROCEDURE — 99214 OFFICE O/P EST MOD 30 MIN: CPT

## 2024-07-14 ENCOUNTER — NON-APPOINTMENT (OUTPATIENT)
Age: 59
End: 2024-07-14

## 2024-07-20 ENCOUNTER — APPOINTMENT (OUTPATIENT)
Dept: RADIOLOGY | Facility: CLINIC | Age: 59
End: 2024-07-20

## 2024-08-08 ENCOUNTER — APPOINTMENT (OUTPATIENT)
Dept: INFECTIOUS DISEASE | Facility: CLINIC | Age: 59
End: 2024-08-08

## 2024-08-08 PROCEDURE — 99204 OFFICE O/P NEW MOD 45 MIN: CPT

## 2024-08-08 NOTE — HISTORY OF PRESENT ILLNESS
[FreeTextEntry1] : 58yo woman with PMH of HTN, DM2 and history of breast cancer diagnosed in 2022 on anastrozole is here for possible recurrent C diff.  She had some dental work for which had antibiotics and in April 2024 had diarrhea for which work up showed c diff. She was given dificid for 10days but diarrhea continued and in June had another C diff test on 6/29 that was positive again and she had vancomycin for 10days with good response. She had liquid diet for more than 8-10 times per day. Currently recovering and has about 3 BMs daily but not liquid. No fever or any other complaint. Taking probiotics.  She was referred here for possible Monocolonal Ab infusion to decrease the risk of recurrence.  No smoking, ETOH or drugs

## 2024-08-08 NOTE — ASSESSMENT
[FreeTextEntry1] : 60yo woman with PMH of HTN, DM2 and history of breast cancer diagnosed in 2022 on anastrozole is here for possible recurrent C diff.  She had some dental work for which had antibiotics and in April 2024 had diarrhea for which work up showed c diff. She was given dificid for 10days but diarrhea continued and in June had another C diff test on 6/29 that was positive. I am not sure if this was a recurrence or she didn't respond to treatment in first try.  She is high risk for recurence anyway.  Recommendations:  - No antibiotic use unless absolutely necessary  - Can use probiotics with ABx in case necessary  - Can have Jalen or yogurt as natural probiotics  - Will try to get bezlotoxumab one time infusion to decrease the possibility of recurrence  - Will need tapering dose of vancomycin if has C diff in the future   RTC pRN   Patient was given the opportunity to ask questions and all questions were answered to their satisfaction. Counseling included lab results, differential diagnosis, treatment options, risks and benefits, lifestyle changes, current condition, medications and dose adjustments. The patient verbalized understanding.

## 2024-08-21 ENCOUNTER — NON-APPOINTMENT (OUTPATIENT)
Age: 59
End: 2024-08-21

## 2024-09-13 ENCOUNTER — APPOINTMENT (OUTPATIENT)
Dept: RADIOLOGY | Facility: CLINIC | Age: 59
End: 2024-09-13

## 2024-11-15 ENCOUNTER — APPOINTMENT (OUTPATIENT)
Dept: GASTROENTEROLOGY | Facility: HOSPITAL | Age: 59
End: 2024-11-15

## 2024-12-03 ENCOUNTER — OUTPATIENT (OUTPATIENT)
Dept: OUTPATIENT SERVICES | Facility: HOSPITAL | Age: 59
LOS: 1 days | Discharge: HOME | End: 2024-12-03

## 2024-12-03 DIAGNOSIS — Z98.891 HISTORY OF UTERINE SCAR FROM PREVIOUS SURGERY: Chronic | ICD-10-CM

## 2024-12-03 DIAGNOSIS — C50.919 MALIGNANT NEOPLASM OF UNSPECIFIED SITE OF UNSPECIFIED FEMALE BREAST: ICD-10-CM

## 2024-12-03 DIAGNOSIS — Z98.890 OTHER SPECIFIED POSTPROCEDURAL STATES: Chronic | ICD-10-CM

## 2024-12-03 DIAGNOSIS — D05.12 INTRADUCTAL CARCINOMA IN SITU OF LEFT BREAST: ICD-10-CM

## 2024-12-11 ENCOUNTER — APPOINTMENT (OUTPATIENT)
Dept: HEMATOLOGY ONCOLOGY | Facility: CLINIC | Age: 59
End: 2024-12-11
Payer: COMMERCIAL

## 2024-12-11 VITALS
WEIGHT: 178.11 LBS | OXYGEN SATURATION: 99 % | RESPIRATION RATE: 16 BRPM | BODY MASS INDEX: 31.55 KG/M2 | HEART RATE: 73 BPM | TEMPERATURE: 96.3 F | DIASTOLIC BLOOD PRESSURE: 84 MMHG | SYSTOLIC BLOOD PRESSURE: 137 MMHG

## 2024-12-11 DIAGNOSIS — Z79.811 LONG TERM (CURRENT) USE OF AROMATASE INHIBITORS: ICD-10-CM

## 2024-12-11 DIAGNOSIS — D05.12 INTRADUCTAL CARCINOMA IN SITU OF LEFT BREAST: ICD-10-CM

## 2024-12-11 PROCEDURE — 99214 OFFICE O/P EST MOD 30 MIN: CPT

## 2024-12-11 PROCEDURE — G2211 COMPLEX E/M VISIT ADD ON: CPT

## 2025-02-01 ENCOUNTER — APPOINTMENT (OUTPATIENT)
Dept: RADIOLOGY | Facility: CLINIC | Age: 60
End: 2025-02-01
Payer: COMMERCIAL

## 2025-02-01 PROCEDURE — 77085 DXA BONE DENSITY AXL VRT FX: CPT

## 2025-02-03 DIAGNOSIS — Z92.89 PERSONAL HISTORY OF OTHER MEDICAL TREATMENT: ICD-10-CM

## 2025-06-19 ENCOUNTER — OUTPATIENT (OUTPATIENT)
Dept: OUTPATIENT SERVICES | Facility: HOSPITAL | Age: 60
LOS: 1 days | Discharge: HOME | End: 2025-06-19

## 2025-06-19 DIAGNOSIS — Z98.890 OTHER SPECIFIED POSTPROCEDURAL STATES: Chronic | ICD-10-CM

## 2025-06-19 DIAGNOSIS — Z98.891 HISTORY OF UTERINE SCAR FROM PREVIOUS SURGERY: Chronic | ICD-10-CM

## 2025-06-19 DIAGNOSIS — D05.12 INTRADUCTAL CARCINOMA IN SITU OF LEFT BREAST: ICD-10-CM

## 2025-06-19 DIAGNOSIS — C50.919 MALIGNANT NEOPLASM OF UNSPECIFIED SITE OF UNSPECIFIED FEMALE BREAST: ICD-10-CM

## 2025-06-23 ENCOUNTER — APPOINTMENT (OUTPATIENT)
Dept: HEMATOLOGY ONCOLOGY | Facility: CLINIC | Age: 60
End: 2025-06-23

## 2025-07-14 ENCOUNTER — APPOINTMENT (OUTPATIENT)
Dept: SURGICAL ONCOLOGY | Facility: CLINIC | Age: 60
End: 2025-07-14
Payer: COMMERCIAL

## 2025-07-14 VITALS
WEIGHT: 165 LBS | OXYGEN SATURATION: 97 % | DIASTOLIC BLOOD PRESSURE: 79 MMHG | SYSTOLIC BLOOD PRESSURE: 117 MMHG | BODY MASS INDEX: 29.23 KG/M2 | HEART RATE: 69 BPM

## 2025-07-14 PROCEDURE — G2211 COMPLEX E/M VISIT ADD ON: CPT | Mod: NC

## 2025-07-14 PROCEDURE — 99214 OFFICE O/P EST MOD 30 MIN: CPT

## 2025-07-14 RX ORDER — SEMAGLUTIDE 1.34 MG/ML
INJECTION, SOLUTION SUBCUTANEOUS
Refills: 0 | Status: ACTIVE | COMMUNITY

## 2025-08-06 ENCOUNTER — OUTPATIENT (OUTPATIENT)
Dept: OUTPATIENT SERVICES | Facility: HOSPITAL | Age: 60
LOS: 1 days | End: 2025-08-06
Payer: COMMERCIAL

## 2025-08-06 ENCOUNTER — RESULT REVIEW (OUTPATIENT)
Age: 60
End: 2025-08-06

## 2025-08-06 ENCOUNTER — APPOINTMENT (OUTPATIENT)
Dept: MAMMOGRAPHY | Facility: CLINIC | Age: 60
End: 2025-08-06
Payer: COMMERCIAL

## 2025-08-06 ENCOUNTER — APPOINTMENT (OUTPATIENT)
Dept: ULTRASOUND IMAGING | Facility: CLINIC | Age: 60
End: 2025-08-06
Payer: COMMERCIAL

## 2025-08-06 DIAGNOSIS — Z98.891 HISTORY OF UTERINE SCAR FROM PREVIOUS SURGERY: Chronic | ICD-10-CM

## 2025-08-06 DIAGNOSIS — Z00.00 ENCOUNTER FOR GENERAL ADULT MEDICAL EXAMINATION WITHOUT ABNORMAL FINDINGS: ICD-10-CM

## 2025-08-06 DIAGNOSIS — Z98.890 OTHER SPECIFIED POSTPROCEDURAL STATES: Chronic | ICD-10-CM

## 2025-08-06 PROCEDURE — 77066 DX MAMMO INCL CAD BI: CPT | Mod: 26

## 2025-08-06 PROCEDURE — 77066 DX MAMMO INCL CAD BI: CPT

## 2025-08-06 PROCEDURE — G0279: CPT

## 2025-08-06 PROCEDURE — 77062 BREAST TOMOSYNTHESIS BI: CPT | Mod: 26

## 2025-08-06 PROCEDURE — 76641 ULTRASOUND BREAST COMPLETE: CPT

## 2025-08-06 PROCEDURE — 76641 ULTRASOUND BREAST COMPLETE: CPT | Mod: 26,50

## 2025-08-07 ENCOUNTER — APPOINTMENT (OUTPATIENT)
Dept: HEMATOLOGY ONCOLOGY | Facility: CLINIC | Age: 60
End: 2025-08-07
Payer: COMMERCIAL

## 2025-08-07 VITALS
OXYGEN SATURATION: 99 % | HEIGHT: 62.2 IN | HEART RATE: 71 BPM | SYSTOLIC BLOOD PRESSURE: 121 MMHG | DIASTOLIC BLOOD PRESSURE: 86 MMHG | RESPIRATION RATE: 16 BRPM | TEMPERATURE: 97.1 F | WEIGHT: 168.75 LBS | BODY MASS INDEX: 30.66 KG/M2

## 2025-08-07 DIAGNOSIS — D05.10 INTRADUCTAL CARCINOMA IN SITU OF UNSPECIFIED BREAST: ICD-10-CM

## 2025-08-07 DIAGNOSIS — Z79.811 LONG TERM (CURRENT) USE OF AROMATASE INHIBITORS: ICD-10-CM

## 2025-08-07 DIAGNOSIS — D05.12 INTRADUCTAL CARCINOMA IN SITU OF LEFT BREAST: ICD-10-CM

## 2025-08-07 PROCEDURE — G2211 COMPLEX E/M VISIT ADD ON: CPT | Mod: NC

## 2025-08-07 PROCEDURE — 99214 OFFICE O/P EST MOD 30 MIN: CPT

## (undated) DEVICE — SUT MONOCRYL 4-0 27" PS-2 UNDYED

## (undated) DEVICE — GLV 7 PROTEXIS (WHITE)

## (undated) DEVICE — WARMING BLANKET LOWER ADULT

## (undated) DEVICE — GOWN LG

## (undated) DEVICE — POSITIONER STRAP ARMBOARD VELCRO TS-30

## (undated) DEVICE — SUT SILK 2-0 30" SH

## (undated) DEVICE — ELCTR BOVIE TIP BLADE INSULATED 2.75" EDGE

## (undated) DEVICE — GLV 7.5 PROTEXIS (WHITE)

## (undated) DEVICE — DRSG STERISTRIPS 0.5 X 4"

## (undated) DEVICE — ELCTR GROUNDING PAD ADULT COVIDIEN

## (undated) DEVICE — DRSG TEGADERM 2.5X3"

## (undated) DEVICE — RADIOGRAPHY DVC SPEC TRANSPEC

## (undated) DEVICE — SOL IRR POUR NS 0.9% 500ML

## (undated) DEVICE — SUT VICRYL 3-0 27" SH UNDYED

## (undated) DEVICE — DRAPE CHEST BREAST 106" X 122"

## (undated) DEVICE — VENODYNE/SCD SLEEVE CALF MEDIUM